# Patient Record
Sex: MALE | Race: WHITE | NOT HISPANIC OR LATINO | Employment: OTHER | URBAN - METROPOLITAN AREA
[De-identification: names, ages, dates, MRNs, and addresses within clinical notes are randomized per-mention and may not be internally consistent; named-entity substitution may affect disease eponyms.]

---

## 2021-01-15 ENCOUNTER — TRANSCRIBE ORDERS (OUTPATIENT)
Dept: SLEEP MEDICINE | Facility: HOSPITAL | Age: 56
End: 2021-01-15

## 2021-01-15 DIAGNOSIS — G47.33 OBSTRUCTIVE SLEEP APNEA, ADULT: Primary | ICD-10-CM

## 2021-01-20 ENCOUNTER — TRANSCRIBE ORDERS (OUTPATIENT)
Dept: SLEEP MEDICINE | Facility: HOSPITAL | Age: 56
End: 2021-01-20

## 2021-01-20 ENCOUNTER — TRANSCRIBE ORDERS (OUTPATIENT)
Dept: OBSTETRICS AND GYNECOLOGY | Facility: CLINIC | Age: 56
End: 2021-01-20

## 2021-01-20 DIAGNOSIS — Z01.818 OTHER SPECIFIED PRE-OPERATIVE EXAMINATION: Primary | ICD-10-CM

## 2021-01-25 ENCOUNTER — LAB (OUTPATIENT)
Dept: LAB | Facility: HOSPITAL | Age: 56
End: 2021-01-25

## 2021-01-25 DIAGNOSIS — Z01.818 OTHER SPECIFIED PRE-OPERATIVE EXAMINATION: ICD-10-CM

## 2021-01-25 LAB — SARS-COV-2 ORF1AB RESP QL NAA+PROBE: NOT DETECTED

## 2021-01-25 PROCEDURE — C9803 HOPD COVID-19 SPEC COLLECT: HCPCS

## 2021-01-25 PROCEDURE — U0004 COV-19 TEST NON-CDC HGH THRU: HCPCS | Performed by: OBSTETRICS & GYNECOLOGY

## 2021-01-27 ENCOUNTER — APPOINTMENT (OUTPATIENT)
Dept: SLEEP MEDICINE | Facility: HOSPITAL | Age: 56
End: 2021-01-27

## 2021-01-29 ENCOUNTER — TRANSCRIBE ORDERS (OUTPATIENT)
Dept: ADMINISTRATIVE | Facility: HOSPITAL | Age: 56
End: 2021-01-29

## 2021-01-29 DIAGNOSIS — Z01.818 OTHER SPECIFIED PRE-OPERATIVE EXAMINATION: Primary | ICD-10-CM

## 2021-01-30 ENCOUNTER — LAB (OUTPATIENT)
Dept: LAB | Facility: HOSPITAL | Age: 56
End: 2021-01-30

## 2021-01-30 ENCOUNTER — APPOINTMENT (OUTPATIENT)
Dept: LAB | Facility: HOSPITAL | Age: 56
End: 2021-01-30

## 2021-01-30 DIAGNOSIS — Z01.818 OTHER SPECIFIED PRE-OPERATIVE EXAMINATION: ICD-10-CM

## 2021-02-02 ENCOUNTER — HOSPITAL ENCOUNTER (OUTPATIENT)
Dept: SLEEP MEDICINE | Facility: HOSPITAL | Age: 56
Discharge: HOME | End: 2021-02-02
Admitting: INTERNAL MEDICINE

## 2021-02-02 DIAGNOSIS — G47.33 OBSTRUCTIVE SLEEP APNEA, ADULT: ICD-10-CM

## 2021-02-02 DIAGNOSIS — G47.33 OSA (OBSTRUCTIVE SLEEP APNEA): ICD-10-CM

## 2021-02-02 PROCEDURE — 95810 POLYSOM 6/> YRS 4/> PARAM: CPT

## 2021-02-02 PROCEDURE — 95810 POLYSOM 6/> YRS 4/> PARAM: CPT | Performed by: INTERNAL MEDICINE

## 2021-02-09 DIAGNOSIS — G47.33 OBSTRUCTIVE SLEEP APNEA, ADULT: Primary | ICD-10-CM

## 2021-02-09 RX ORDER — ZOLPIDEM TARTRATE 5 MG/1
5 TABLET ORAL TAKE AS DIRECTED
Qty: 2 TABLET | Refills: 0 | Status: ON HOLD | OUTPATIENT
Start: 2021-02-09 | End: 2021-04-13

## 2021-02-12 ENCOUNTER — TRANSCRIBE ORDERS (OUTPATIENT)
Dept: SLEEP MEDICINE | Facility: HOSPITAL | Age: 56
End: 2021-02-12

## 2021-02-12 DIAGNOSIS — G47.33 OSA (OBSTRUCTIVE SLEEP APNEA): Primary | ICD-10-CM

## 2021-02-15 ENCOUNTER — DOCUMENTATION (OUTPATIENT)
Dept: SLEEP MEDICINE | Facility: HOSPITAL | Age: 56
End: 2021-02-15

## 2021-02-16 ENCOUNTER — TRANSCRIBE ORDERS (OUTPATIENT)
Dept: OBSTETRICS AND GYNECOLOGY | Facility: CLINIC | Age: 56
End: 2021-02-16

## 2021-02-16 DIAGNOSIS — Z01.818 OTHER SPECIFIED PRE-OPERATIVE EXAMINATION: Primary | ICD-10-CM

## 2021-04-01 ENCOUNTER — APPOINTMENT (OUTPATIENT)
Dept: SLEEP MEDICINE | Facility: HOSPITAL | Age: 56
End: 2021-04-01

## 2021-04-12 ENCOUNTER — APPOINTMENT (OUTPATIENT)
Dept: SLEEP MEDICINE | Facility: HOSPITAL | Age: 56
End: 2021-04-12

## 2021-04-13 ENCOUNTER — HOSPITAL ENCOUNTER (OUTPATIENT)
Facility: HOSPITAL | Age: 56
Setting detail: OBSERVATION
Discharge: HOME | End: 2021-04-15
Attending: INTERNAL MEDICINE | Admitting: HOSPITALIST

## 2021-04-13 ENCOUNTER — APPOINTMENT (OUTPATIENT)
Dept: CT IMAGING | Facility: HOSPITAL | Age: 56
End: 2021-04-13

## 2021-04-13 ENCOUNTER — HOSPITAL ENCOUNTER (EMERGENCY)
Facility: HOSPITAL | Age: 56
Discharge: ANOTHER HOSPITAL | End: 2021-04-13
Attending: EMERGENCY MEDICINE | Admitting: EMERGENCY MEDICINE

## 2021-04-13 ENCOUNTER — APPOINTMENT (OUTPATIENT)
Dept: MRI IMAGING | Facility: HOSPITAL | Age: 56
End: 2021-04-13

## 2021-04-13 ENCOUNTER — APPOINTMENT (OUTPATIENT)
Dept: GENERAL RADIOLOGY | Facility: HOSPITAL | Age: 56
End: 2021-04-13

## 2021-04-13 VITALS
DIASTOLIC BLOOD PRESSURE: 75 MMHG | HEIGHT: 72 IN | SYSTOLIC BLOOD PRESSURE: 118 MMHG | WEIGHT: 235 LBS | RESPIRATION RATE: 18 BRPM | HEART RATE: 70 BPM | TEMPERATURE: 97.7 F | BODY MASS INDEX: 31.83 KG/M2 | OXYGEN SATURATION: 94 %

## 2021-04-13 DIAGNOSIS — H40.053 ELEVATED IOP, BILATERAL: ICD-10-CM

## 2021-04-13 DIAGNOSIS — G44.209 ACUTE NON INTRACTABLE TENSION-TYPE HEADACHE: ICD-10-CM

## 2021-04-13 DIAGNOSIS — H53.2 DIPLOPIA: Primary | ICD-10-CM

## 2021-04-13 LAB
ALBUMIN SERPL-MCNC: 4.5 G/DL (ref 3.5–5.2)
ALBUMIN/GLOB SERPL: 1.4 G/DL
ALP SERPL-CCNC: 87 U/L (ref 39–117)
ALT SERPL W P-5'-P-CCNC: 45 U/L (ref 1–41)
ANION GAP SERPL CALCULATED.3IONS-SCNC: 12.8 MMOL/L (ref 5–15)
AST SERPL-CCNC: 27 U/L (ref 1–40)
BASOPHILS # BLD AUTO: 0.05 10*3/MM3 (ref 0–0.2)
BASOPHILS NFR BLD AUTO: 0.6 % (ref 0–1.5)
BILIRUB SERPL-MCNC: 0.8 MG/DL (ref 0–1.2)
BILIRUB UR QL STRIP: NEGATIVE
BUN SERPL-MCNC: 15 MG/DL (ref 6–20)
BUN/CREAT SERPL: 16.9 (ref 7–25)
CALCIUM SPEC-SCNC: 9.3 MG/DL (ref 8.6–10.5)
CHLORIDE SERPL-SCNC: 105 MMOL/L (ref 98–107)
CLARITY UR: CLEAR
CO2 SERPL-SCNC: 22.2 MMOL/L (ref 22–29)
COLOR UR: YELLOW
CREAT SERPL-MCNC: 0.89 MG/DL (ref 0.76–1.27)
DEPRECATED RDW RBC AUTO: 41.1 FL (ref 37–54)
EOSINOPHIL # BLD AUTO: 0.24 10*3/MM3 (ref 0–0.4)
EOSINOPHIL NFR BLD AUTO: 2.9 % (ref 0.3–6.2)
ERYTHROCYTE [DISTWIDTH] IN BLOOD BY AUTOMATED COUNT: 11.8 % (ref 12.3–15.4)
GFR SERPL CREATININE-BSD FRML MDRD: 89 ML/MIN/1.73
GLOBULIN UR ELPH-MCNC: 3.2 GM/DL
GLUCOSE BLDC GLUCOMTR-MCNC: 135 MG/DL (ref 70–130)
GLUCOSE BLDC GLUCOMTR-MCNC: 138 MG/DL (ref 70–130)
GLUCOSE SERPL-MCNC: 188 MG/DL (ref 65–99)
GLUCOSE UR STRIP-MCNC: NEGATIVE MG/DL
HCT VFR BLD AUTO: 47.2 % (ref 37.5–51)
HGB BLD-MCNC: 16 G/DL (ref 13–17.7)
HGB UR QL STRIP.AUTO: NEGATIVE
IMM GRANULOCYTES # BLD AUTO: 0.04 10*3/MM3 (ref 0–0.05)
IMM GRANULOCYTES NFR BLD AUTO: 0.5 % (ref 0–0.5)
KETONES UR QL STRIP: NEGATIVE
LEUKOCYTE ESTERASE UR QL STRIP.AUTO: NEGATIVE
LYMPHOCYTES # BLD AUTO: 2.39 10*3/MM3 (ref 0.7–3.1)
LYMPHOCYTES NFR BLD AUTO: 29.1 % (ref 19.6–45.3)
MCH RBC QN AUTO: 32.5 PG (ref 26.6–33)
MCHC RBC AUTO-ENTMCNC: 33.9 G/DL (ref 31.5–35.7)
MCV RBC AUTO: 95.7 FL (ref 79–97)
MONOCYTES # BLD AUTO: 0.8 10*3/MM3 (ref 0.1–0.9)
MONOCYTES NFR BLD AUTO: 9.8 % (ref 5–12)
NEUTROPHILS NFR BLD AUTO: 4.68 10*3/MM3 (ref 1.7–7)
NEUTROPHILS NFR BLD AUTO: 57.1 % (ref 42.7–76)
NITRITE UR QL STRIP: NEGATIVE
NRBC BLD AUTO-RTO: 0 /100 WBC (ref 0–0.2)
PH UR STRIP.AUTO: 6 [PH] (ref 4.5–8)
PLATELET # BLD AUTO: 142 10*3/MM3 (ref 140–450)
PMV BLD AUTO: 10.9 FL (ref 6–12)
POTASSIUM SERPL-SCNC: 4.3 MMOL/L (ref 3.5–5.2)
PROT SERPL-MCNC: 7.7 G/DL (ref 6–8.5)
PROT UR QL STRIP: NEGATIVE
RBC # BLD AUTO: 4.93 10*6/MM3 (ref 4.14–5.8)
SARS-COV-2 RNA PNL SPEC NAA+PROBE: NOT DETECTED
SODIUM SERPL-SCNC: 140 MMOL/L (ref 136–145)
SP GR UR STRIP: 1.02 (ref 1–1.03)
T4 FREE SERPL-MCNC: 1.17 NG/DL (ref 0.93–1.7)
TROPONIN T SERPL-MCNC: <0.01 NG/ML (ref 0–0.03)
TSH SERPL DL<=0.05 MIU/L-ACNC: 0.64 UIU/ML (ref 0.27–4.2)
UROBILINOGEN UR QL STRIP: NORMAL
WBC # BLD AUTO: 8.2 10*3/MM3 (ref 3.4–10.8)

## 2021-04-13 PROCEDURE — A9577 INJ MULTIHANCE: HCPCS | Performed by: EMERGENCY MEDICINE

## 2021-04-13 PROCEDURE — 70150 X-RAY EXAM OF FACIAL BONES: CPT

## 2021-04-13 PROCEDURE — 96374 THER/PROPH/DIAG INJ IV PUSH: CPT

## 2021-04-13 PROCEDURE — 70553 MRI BRAIN STEM W/O & W/DYE: CPT

## 2021-04-13 PROCEDURE — 80053 COMPREHEN METABOLIC PANEL: CPT | Performed by: PHYSICIAN ASSISTANT

## 2021-04-13 PROCEDURE — 84443 ASSAY THYROID STIM HORMONE: CPT | Performed by: PHYSICIAN ASSISTANT

## 2021-04-13 PROCEDURE — 81003 URINALYSIS AUTO W/O SCOPE: CPT | Performed by: PHYSICIAN ASSISTANT

## 2021-04-13 PROCEDURE — 25010000002 PROCHLORPERAZINE 10 MG/2ML SOLUTION: Performed by: PHYSICIAN ASSISTANT

## 2021-04-13 PROCEDURE — 82962 GLUCOSE BLOOD TEST: CPT

## 2021-04-13 PROCEDURE — 70496 CT ANGIOGRAPHY HEAD: CPT

## 2021-04-13 PROCEDURE — 85025 COMPLETE CBC W/AUTO DIFF WBC: CPT | Performed by: PHYSICIAN ASSISTANT

## 2021-04-13 PROCEDURE — 84484 ASSAY OF TROPONIN QUANT: CPT | Performed by: PHYSICIAN ASSISTANT

## 2021-04-13 PROCEDURE — 99284 EMERGENCY DEPT VISIT MOD MDM: CPT | Performed by: PHYSICIAN ASSISTANT

## 2021-04-13 PROCEDURE — 70498 CT ANGIOGRAPHY NECK: CPT

## 2021-04-13 PROCEDURE — 99284 EMERGENCY DEPT VISIT MOD MDM: CPT

## 2021-04-13 PROCEDURE — 0 GADOBENATE DIMEGLUMINE 529 MG/ML SOLUTION: Performed by: EMERGENCY MEDICINE

## 2021-04-13 PROCEDURE — G0378 HOSPITAL OBSERVATION PER HR: HCPCS

## 2021-04-13 PROCEDURE — 25010000002 DIPHENHYDRAMINE PER 50 MG: Performed by: PHYSICIAN ASSISTANT

## 2021-04-13 PROCEDURE — 96375 TX/PRO/DX INJ NEW DRUG ADDON: CPT

## 2021-04-13 PROCEDURE — 87635 SARS-COV-2 COVID-19 AMP PRB: CPT | Performed by: PHYSICIAN ASSISTANT

## 2021-04-13 PROCEDURE — 93005 ELECTROCARDIOGRAM TRACING: CPT | Performed by: PHYSICIAN ASSISTANT

## 2021-04-13 PROCEDURE — 36415 COLL VENOUS BLD VENIPUNCTURE: CPT

## 2021-04-13 PROCEDURE — 84439 ASSAY OF FREE THYROXINE: CPT | Performed by: PHYSICIAN ASSISTANT

## 2021-04-13 PROCEDURE — 70450 CT HEAD/BRAIN W/O DYE: CPT

## 2021-04-13 PROCEDURE — C9803 HOPD COVID-19 SPEC COLLECT: HCPCS | Performed by: PHYSICIAN ASSISTANT

## 2021-04-13 PROCEDURE — 0 IOPAMIDOL PER 1 ML: Performed by: EMERGENCY MEDICINE

## 2021-04-13 PROCEDURE — 93010 ELECTROCARDIOGRAM REPORT: CPT | Performed by: INTERNAL MEDICINE

## 2021-04-13 RX ORDER — PROCHLORPERAZINE EDISYLATE 5 MG/ML
5 INJECTION INTRAMUSCULAR; INTRAVENOUS ONCE
Status: COMPLETED | OUTPATIENT
Start: 2021-04-13 | End: 2021-04-13

## 2021-04-13 RX ORDER — DIPHENHYDRAMINE HYDROCHLORIDE 50 MG/ML
25 INJECTION INTRAMUSCULAR; INTRAVENOUS ONCE
Status: COMPLETED | OUTPATIENT
Start: 2021-04-13 | End: 2021-04-13

## 2021-04-13 RX ORDER — ALBUTEROL SULFATE 1.25 MG/3ML
1 SOLUTION RESPIRATORY (INHALATION) EVERY 6 HOURS PRN
COMMUNITY

## 2021-04-13 RX ORDER — PROPARACAINE HYDROCHLORIDE 5 MG/ML
2 SOLUTION/ DROPS OPHTHALMIC ONCE
Status: COMPLETED | OUTPATIENT
Start: 2021-04-13 | End: 2021-04-13

## 2021-04-13 RX ORDER — SODIUM CHLORIDE 0.9 % (FLUSH) 0.9 %
10 SYRINGE (ML) INJECTION AS NEEDED
Status: DISCONTINUED | OUTPATIENT
Start: 2021-04-13 | End: 2021-04-13 | Stop reason: HOSPADM

## 2021-04-13 RX ADMIN — GADOBENATE DIMEGLUMINE 20 ML: 529 INJECTION, SOLUTION INTRAVENOUS at 17:18

## 2021-04-13 RX ADMIN — PROCHLORPERAZINE EDISYLATE 5 MG: 5 INJECTION INTRAMUSCULAR; INTRAVENOUS at 19:39

## 2021-04-13 RX ADMIN — IOPAMIDOL 100 ML: 755 INJECTION, SOLUTION INTRAVENOUS at 14:07

## 2021-04-13 RX ADMIN — DIPHENHYDRAMINE HYDROCHLORIDE 25 MG: 50 INJECTION, SOLUTION INTRAMUSCULAR; INTRAVENOUS at 19:36

## 2021-04-13 RX ADMIN — PROPARACAINE HYDROCHLORIDE 2 DROP: 5 SOLUTION/ DROPS OPHTHALMIC at 15:50

## 2021-04-13 RX ADMIN — FLUORESCEIN SODIUM 1 STRIP: 1 STRIP OPHTHALMIC at 15:49

## 2021-04-13 NOTE — ED PROVIDER NOTES
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 97.9 °F (36.6 °C) (Oral) O2 sat- 97%    DISPOSITION  Patient transferred to [facility] for further neurology and ophthalmology work-up.       Medication List      No changes were made to your prescriptions during this visit.                   Dictated utilizing Dragon dictation     Eufemia Rodriguez PA-C  04/13/21 2115       Eufemia Rodriguez PA-C  04/13/21 2116

## 2021-04-13 NOTE — ED NOTES
Call [facility] hospitalist. They will be paged and return the call     Antonina Lewis  04/13/21 6245

## 2021-04-14 ENCOUNTER — APPOINTMENT (OUTPATIENT)
Dept: MRI IMAGING | Facility: HOSPITAL | Age: 56
End: 2021-04-14

## 2021-04-14 PROBLEM — H53.8 BLURRED VISION: Status: ACTIVE | Noted: 2021-04-14

## 2021-04-14 PROBLEM — H53.8 BLURRED VISION, BILATERAL: Status: ACTIVE | Noted: 2021-04-14

## 2021-04-14 PROBLEM — E11.65 TYPE 2 DIABETES MELLITUS WITH HYPERGLYCEMIA, WITHOUT LONG-TERM CURRENT USE OF INSULIN (HCC): Status: ACTIVE | Noted: 2021-04-14

## 2021-04-14 LAB
ANION GAP SERPL CALCULATED.3IONS-SCNC: 9.5 MMOL/L (ref 5–15)
BASOPHILS # BLD AUTO: 0.04 10*3/MM3 (ref 0–0.2)
BASOPHILS NFR BLD AUTO: 0.6 % (ref 0–1.5)
BUN SERPL-MCNC: 13 MG/DL (ref 6–20)
BUN/CREAT SERPL: 16 (ref 7–25)
CALCIUM SPEC-SCNC: 8.8 MG/DL (ref 8.6–10.5)
CHLORIDE SERPL-SCNC: 108 MMOL/L (ref 98–107)
CO2 SERPL-SCNC: 23.5 MMOL/L (ref 22–29)
CREAT SERPL-MCNC: 0.81 MG/DL (ref 0.76–1.27)
CRP SERPL-MCNC: 0.56 MG/DL (ref 0–0.5)
DEPRECATED RDW RBC AUTO: 40.5 FL (ref 37–54)
EOSINOPHIL # BLD AUTO: 0.29 10*3/MM3 (ref 0–0.4)
EOSINOPHIL NFR BLD AUTO: 4.2 % (ref 0.3–6.2)
ERYTHROCYTE [DISTWIDTH] IN BLOOD BY AUTOMATED COUNT: 11.7 % (ref 12.3–15.4)
ERYTHROCYTE [SEDIMENTATION RATE] IN BLOOD: 10 MM/HR (ref 0–20)
GFR SERPL CREATININE-BSD FRML MDRD: 99 ML/MIN/1.73
GLUCOSE BLDC GLUCOMTR-MCNC: 151 MG/DL (ref 70–130)
GLUCOSE BLDC GLUCOMTR-MCNC: 182 MG/DL (ref 70–130)
GLUCOSE BLDC GLUCOMTR-MCNC: 205 MG/DL (ref 70–130)
GLUCOSE BLDC GLUCOMTR-MCNC: 241 MG/DL (ref 70–130)
GLUCOSE SERPL-MCNC: 238 MG/DL (ref 65–99)
HBA1C MFR BLD: 7.9 % (ref 4.8–5.6)
HCT VFR BLD AUTO: 42.9 % (ref 37.5–51)
HGB BLD-MCNC: 14.8 G/DL (ref 13–17.7)
INR PPP: 1.07 (ref 0.9–1.1)
LYMPHOCYTES # BLD AUTO: 2.48 10*3/MM3 (ref 0.7–3.1)
LYMPHOCYTES NFR BLD AUTO: 35.7 % (ref 19.6–45.3)
MAGNESIUM SERPL-MCNC: 2.1 MG/DL (ref 1.6–2.6)
MCH RBC QN AUTO: 33.3 PG (ref 26.6–33)
MCHC RBC AUTO-ENTMCNC: 34.5 G/DL (ref 31.5–35.7)
MCV RBC AUTO: 96.4 FL (ref 79–97)
MONOCYTES # BLD AUTO: 0.82 10*3/MM3 (ref 0.1–0.9)
MONOCYTES NFR BLD AUTO: 11.8 % (ref 5–12)
NEUTROPHILS NFR BLD AUTO: 3.3 10*3/MM3 (ref 1.7–7)
NEUTROPHILS NFR BLD AUTO: 47.4 % (ref 42.7–76)
PLATELET # BLD AUTO: 138 10*3/MM3 (ref 140–450)
PMV BLD AUTO: 11.1 FL (ref 6–12)
POTASSIUM SERPL-SCNC: 4 MMOL/L (ref 3.5–5.2)
PROTHROMBIN TIME: 13.7 SECONDS (ref 11.7–14.2)
QT INTERVAL: 335 MS
RBC # BLD AUTO: 4.45 10*6/MM3 (ref 4.14–5.8)
SODIUM SERPL-SCNC: 141 MMOL/L (ref 136–145)
TSH SERPL DL<=0.05 MIU/L-ACNC: 1.72 UIU/ML (ref 0.27–4.2)
WBC # BLD AUTO: 6.95 10*3/MM3 (ref 3.4–10.8)

## 2021-04-14 PROCEDURE — G0378 HOSPITAL OBSERVATION PER HR: HCPCS

## 2021-04-14 PROCEDURE — 83735 ASSAY OF MAGNESIUM: CPT | Performed by: NURSE PRACTITIONER

## 2021-04-14 PROCEDURE — 85025 COMPLETE CBC W/AUTO DIFF WBC: CPT | Performed by: NURSE PRACTITIONER

## 2021-04-14 PROCEDURE — 80048 BASIC METABOLIC PNL TOTAL CA: CPT | Performed by: NURSE PRACTITIONER

## 2021-04-14 PROCEDURE — 0 GADOBENATE DIMEGLUMINE 529 MG/ML SOLUTION: Performed by: HOSPITALIST

## 2021-04-14 PROCEDURE — 96360 HYDRATION IV INFUSION INIT: CPT

## 2021-04-14 PROCEDURE — 85610 PROTHROMBIN TIME: CPT | Performed by: NURSE PRACTITIONER

## 2021-04-14 PROCEDURE — 80061 LIPID PANEL: CPT | Performed by: HOSPITALIST

## 2021-04-14 PROCEDURE — A9577 INJ MULTIHANCE: HCPCS | Performed by: HOSPITALIST

## 2021-04-14 PROCEDURE — 99204 OFFICE O/P NEW MOD 45 MIN: CPT | Performed by: PSYCHIATRY & NEUROLOGY

## 2021-04-14 PROCEDURE — 85652 RBC SED RATE AUTOMATED: CPT | Performed by: NURSE PRACTITIONER

## 2021-04-14 PROCEDURE — 86769 SARS-COV-2 COVID-19 ANTIBODY: CPT | Performed by: HOSPITALIST

## 2021-04-14 PROCEDURE — 83520 IMMUNOASSAY QUANT NOS NONAB: CPT | Performed by: PSYCHIATRY & NEUROLOGY

## 2021-04-14 PROCEDURE — 63710000001 INSULIN LISPRO (HUMAN) PER 5 UNITS: Performed by: NURSE PRACTITIONER

## 2021-04-14 PROCEDURE — 70543 MRI ORBT/FAC/NCK W/O &W/DYE: CPT

## 2021-04-14 PROCEDURE — 86140 C-REACTIVE PROTEIN: CPT | Performed by: NURSE PRACTITIONER

## 2021-04-14 PROCEDURE — 84443 ASSAY THYROID STIM HORMONE: CPT | Performed by: NURSE PRACTITIONER

## 2021-04-14 PROCEDURE — 83036 HEMOGLOBIN GLYCOSYLATED A1C: CPT | Performed by: NURSE PRACTITIONER

## 2021-04-14 PROCEDURE — 82962 GLUCOSE BLOOD TEST: CPT

## 2021-04-14 PROCEDURE — 96361 HYDRATE IV INFUSION ADD-ON: CPT

## 2021-04-14 RX ORDER — ACETAMINOPHEN 160 MG/5ML
650 SOLUTION ORAL EVERY 4 HOURS PRN
Status: DISCONTINUED | OUTPATIENT
Start: 2021-04-14 | End: 2021-04-15 | Stop reason: HOSPADM

## 2021-04-14 RX ORDER — ALBUTEROL SULFATE 1.25 MG/3ML
1 SOLUTION RESPIRATORY (INHALATION) EVERY 6 HOURS PRN
Status: DISCONTINUED | OUTPATIENT
Start: 2021-04-14 | End: 2021-04-15 | Stop reason: HOSPADM

## 2021-04-14 RX ORDER — NICOTINE POLACRILEX 4 MG
15 LOZENGE BUCCAL
Status: DISCONTINUED | OUTPATIENT
Start: 2021-04-14 | End: 2021-04-15 | Stop reason: HOSPADM

## 2021-04-14 RX ORDER — SODIUM CHLORIDE 0.9 % (FLUSH) 0.9 %
10 SYRINGE (ML) INJECTION AS NEEDED
Status: DISCONTINUED | OUTPATIENT
Start: 2021-04-14 | End: 2021-04-15 | Stop reason: HOSPADM

## 2021-04-14 RX ORDER — ACETAMINOPHEN 650 MG/1
650 SUPPOSITORY RECTAL EVERY 4 HOURS PRN
Status: DISCONTINUED | OUTPATIENT
Start: 2021-04-14 | End: 2021-04-15 | Stop reason: HOSPADM

## 2021-04-14 RX ORDER — FOLIC ACID 1 MG/1
1 TABLET ORAL DAILY
Status: DISCONTINUED | OUTPATIENT
Start: 2021-04-15 | End: 2021-04-15 | Stop reason: HOSPADM

## 2021-04-14 RX ORDER — ACETAMINOPHEN 325 MG/1
650 TABLET ORAL EVERY 4 HOURS PRN
Status: DISCONTINUED | OUTPATIENT
Start: 2021-04-14 | End: 2021-04-15 | Stop reason: HOSPADM

## 2021-04-14 RX ORDER — SODIUM CHLORIDE 9 MG/ML
100 INJECTION, SOLUTION INTRAVENOUS CONTINUOUS
Status: DISCONTINUED | OUTPATIENT
Start: 2021-04-14 | End: 2021-04-14

## 2021-04-14 RX ORDER — DEXTROSE MONOHYDRATE 25 G/50ML
25 INJECTION, SOLUTION INTRAVENOUS
Status: DISCONTINUED | OUTPATIENT
Start: 2021-04-14 | End: 2021-04-15 | Stop reason: HOSPADM

## 2021-04-14 RX ORDER — INSULIN LISPRO 100 [IU]/ML
0-7 INJECTION, SOLUTION INTRAVENOUS; SUBCUTANEOUS
Status: DISCONTINUED | OUTPATIENT
Start: 2021-04-14 | End: 2021-04-15 | Stop reason: HOSPADM

## 2021-04-14 RX ORDER — SODIUM CHLORIDE 0.9 % (FLUSH) 0.9 %
10 SYRINGE (ML) INJECTION EVERY 12 HOURS SCHEDULED
Status: DISCONTINUED | OUTPATIENT
Start: 2021-04-14 | End: 2021-04-15 | Stop reason: HOSPADM

## 2021-04-14 RX ORDER — DIPHENOXYLATE HYDROCHLORIDE AND ATROPINE SULFATE 2.5; .025 MG/1; MG/1
1 TABLET ORAL DAILY
Status: DISCONTINUED | OUTPATIENT
Start: 2021-04-15 | End: 2021-04-15 | Stop reason: HOSPADM

## 2021-04-14 RX ORDER — ONDANSETRON 2 MG/ML
4 INJECTION INTRAMUSCULAR; INTRAVENOUS EVERY 6 HOURS PRN
Status: DISCONTINUED | OUTPATIENT
Start: 2021-04-14 | End: 2021-04-15 | Stop reason: HOSPADM

## 2021-04-14 RX ORDER — NITROGLYCERIN 0.4 MG/1
0.4 TABLET SUBLINGUAL
Status: DISCONTINUED | OUTPATIENT
Start: 2021-04-14 | End: 2021-04-15 | Stop reason: HOSPADM

## 2021-04-14 RX ADMIN — ACETAMINOPHEN 650 MG: 325 TABLET, FILM COATED ORAL at 17:41

## 2021-04-14 RX ADMIN — INSULIN LISPRO 3 UNITS: 100 INJECTION, SOLUTION INTRAVENOUS; SUBCUTANEOUS at 17:41

## 2021-04-14 RX ADMIN — GADOBENATE DIMEGLUMINE 20 ML: 529 INJECTION, SOLUTION INTRAVENOUS at 16:55

## 2021-04-14 RX ADMIN — SODIUM CHLORIDE 100 ML/HR: 9 INJECTION, SOLUTION INTRAVENOUS at 00:22

## 2021-04-14 RX ADMIN — SODIUM CHLORIDE, PRESERVATIVE FREE 10 ML: 5 INJECTION INTRAVENOUS at 01:20

## 2021-04-14 RX ADMIN — SODIUM CHLORIDE, PRESERVATIVE FREE 10 ML: 5 INJECTION INTRAVENOUS at 21:36

## 2021-04-14 NOTE — PROGRESS NOTES
[facility]     LOS: 0 days     Subjective:    CC:No chief complaint on file.    DIET:  Diet Order   Procedures   • Diet Regular; Consistent Carbohydrate   vision is still abnormal  Still with double vision    no cp  No soa  No n/v/d    Objective:    Vital Signs:  Temp:  [97.6 °F (36.4 °C)-98 °F (36.7 °C)] 98 °F (36.7 °C)  Heart Rate:  [70-91] 90  Resp:  [14-20] 18  BP: (103-137)/(70-97) 130/84    SpO2:  [94 %-98 %] 95 %  on   ;   Device (Oxygen Therapy): room air  There is no height or weight on file to calculate BMI.    Physical Exam  Constitutional:       Appearance: He is well-developed.   HENT:      Head: Normocephalic and atraumatic.   Cardiovascular:      Rate and Rhythm: Normal rate and regular rhythm.      Heart sounds: No murmur heard.   No friction rub.   Pulmonary:      Effort: Pulmonary effort is normal.      Breath sounds: Normal breath sounds.   Abdominal:      General: Bowel sounds are normal. There is no distension.      Palpations: Abdomen is soft.      Tenderness: There is no abdominal tenderness.   Skin:     General: Skin is warm and dry.   Neurological:      Mental Status: He is alert.   Psychiatric:         Mood and Affect: Mood normal.         Behavior: Behavior normal.         Results Review:    Glucose   Date Value Ref Range Status   04/14/2021 238 (H) 65 - 99 mg/dL Final   04/13/2021 188 (H) 65 - 99 mg/dL Final     Results from last 7 days   Lab Units 04/14/21  0459   WBC 10*3/mm3 6.95   HEMOGLOBIN g/dL 14.8   HEMATOCRIT % 42.9   PLATELETS 10*3/mm3 138*     Results from last 7 days   Lab Units 04/14/21  0459 04/13/21  1132   SODIUM mmol/L 141 140   POTASSIUM mmol/L 4.0 4.3   CHLORIDE mmol/L 108* 105   CO2 mmol/L 23.5 22.2   BUN mg/dL 13 15   CREATININE mg/dL 0.81 0.89   CALCIUM mg/dL 8.8 9.3   BILIRUBIN mg/dL  --  0.8   ALK PHOS U/L  --  87   ALT (SGPT) U/L  --  45*   AST (SGOT) U/L  --  27   GLUCOSE mg/dL 238* 188*     Results from last 7 days   Lab Units  04/14/21  0459   INR  1.07     Results from last 7 days   Lab Units 04/14/21  0459   MAGNESIUM mg/dL 2.1     Results from last 7 days   Lab Units 04/13/21  1132   TROPONIN T ng/mL <0.010     Cultures:  No results found for: BLOODCX, URINECX, WOUNDCX, MRSACX, RESPCX, STOOLCX    I have reviewed daily medications and changes in CPOE    Scheduled meds  [START ON 4/15/2021] thiamine, 100 mg, Oral, Daily   And  [START ON 4/15/2021] multivitamin, 1 tablet, Oral, Daily   And  [START ON 4/15/2021] folic acid, 1 mg, Oral, Daily  insulin lispro, 0-7 Units, Subcutaneous, TID AC  sodium chloride, 10 mL, Intravenous, Q12H           PRN meds  •  acetaminophen **OR** acetaminophen **OR** acetaminophen  •  albuterol  •  dextrose  •  dextrose  •  glucagon (human recombinant)  •  nitroglycerin  •  ondansetron  •  sodium chloride        Blurred vision, bilateral    Type 2 diabetes mellitus with hyperglycemia, without long-term current use of insulin (CMS/[facility])    Blurred vision      Assessment/Plan:     Blurred vision bilaterally  -Consult neurology and d/w them  -Consult ophthalmology and he is to follow up with them tomorrow assuming the orbital mri is unremarkable  -CTA and MRI head done at [facility]  -headaches over the last week, never had his before, mild eye pain, visual acuity remains intact   -likely d/c on aspirin and lipitor    Type 2 diabetes  -Accu-Cheks before meals and at bedtime with correctional dose insulin  -HbA1c 7.9  -Hold Januvia and Trulicity while hospitalized    DVT PPX: scd      Yuriy Krueger MD  04/14/21  14:51 EDT

## 2021-04-14 NOTE — H&P
Patient Name:  Brandon Tavera  YOB: 1965  MRN:  [id number]  Admit Date:  4/13/2021  Patient Care Team:  Provider, No Known as PCP - General      Subjective   History Present Illness     Chief complaint: Blurred vision and headache    History of Present Illness   Mr. Tavera is a 56-year-old male with history of type 2 diabetes and asthma who presented to the emergency room at [facility] with complaints of diplopia for the past 5 days, it is not getting worse, but is not improved.  He has had intermittent headaches located behind his eye and across his forehead.  He states nothing seems to make it worse or better.  He has not had recent eye exam in the past 2 years, he does wear glasses.  Patient states when he closes one eye he does not have blurred vision or double vision, but with both eyes open he has blurred/double vision.  He denies any trouble with speech, no other neuro deficits, no weakness or numbness and tingling in any extremities.  At [facility] patient had CTA of his head and neck per recommendation of neurology, CT did show a very tiny aneurysm that neurology thought would not be causing his double vision, he also had MRI of his head that showed no acute infarct or intracranial hemorrhage.  The emergency room provider at [facility] also performed eye exam that showed intraocular pressure on his right eye was 24 mmHg and his left eye was 20 mmHg.  The pressures were discussed with Dr. Myrick with neurology who recommend transfer patient here to [facility] for ophthalmology to evaluate.  Vital signs and lab work about [address] [place] were all stable.  Patient does state he drinks a cocktail nightly before bed.    Review of Systems   Constitutional: Negative for appetite change and fever.   HENT: Negative for nosebleeds and trouble swallowing.    Eyes: Positive for pain (pressure behind his eyes), redness and visual disturbance  (blurred and double vision when both eyes are open). Negative for photophobia.   Respiratory: Negative for cough, chest tightness, shortness of breath and wheezing.    Cardiovascular: Negative for chest pain, palpitations and leg swelling.   Gastrointestinal: Negative for abdominal distention, abdominal pain, nausea and vomiting.   Endocrine: Negative.    Genitourinary: Negative.    Musculoskeletal: Negative for gait problem and joint swelling.   Skin: Negative.    Neurological: Positive for headaches (forhead area and behind eyes). Negative for dizziness, seizures, speech difficulty and light-headedness.   Hematological: Negative.    Psychiatric/Behavioral: Negative for behavioral problems and confusion.        Personal History     Past Medical History:   Diagnosis Date   • Diabetes mellitus (CMS/HCC)    • Skin cancer      Past Surgical History:   Procedure Laterality Date   • MOHS SURGERY       No family history on file.  Social History     Tobacco Use   • Smoking status: Former Smoker   Substance Use Topics   • Alcohol use: Yes   • Drug use: Not on file     Current Facility-Administered Medications on File Prior to Encounter   Medication Dose Route Frequency Provider Last Rate Last Admin   • [COMPLETED] diphenhydrAMINE (BENADRYL) injection 25 mg  25 mg Intravenous Once Eufemia Rodriguez PA-C   25 mg at 04/13/21 1936   • [COMPLETED] fluorescein ophthalmic strip 1 strip  1 strip Both Eyes Once Eufemia Rodriguez PA-C   1 strip at 04/13/21 1549   • [COMPLETED] gadobenate dimeglumine (MULTIHANCE) injection 20 mL  20 mL Intravenous Once in imaging Jeffrey Oconnor MD   20 mL at 04/13/21 1718   • [COMPLETED] iopamidol (ISOVUE-370) 76 % injection 100 mL  100 mL Intravenous Once in imaging Jeffrey Oconnor MD   100 mL at 04/13/21 1407   • [COMPLETED] prochlorperazine (COMPAZINE) injection 5 mg  5 mg Intravenous Once Eufemia Rodriguez PA-C   5 mg at 04/13/21 1939   • [COMPLETED] proparacaine (ALCAINE) 0.5 %  ophthalmic solution 2 drop  2 drop Both Eyes Once Eufemia Rodriguez PA-C   2 drop at 04/13/21 1550   • [DISCONTINUED] sodium chloride 0.9 % flush 10 mL  10 mL Intravenous PRN Eufemia Rodriguez PA-C         Current Outpatient Medications on File Prior to Encounter   Medication Sig Dispense Refill   • albuterol (ACCUNEB) 1.25 MG/3ML nebulizer solution Take 1 ampule by nebulization Every 6 (Six) Hours As Needed for Wheezing.     • Dulaglutide (TRULICITY SC) Inject 1.3 Int'l Units/mL under the skin into the appropriate area as directed 1 (One) Time Per Week. Due every wednesdays     • ipratropium-albuterol (COMBIVENT RESPIMAT)  MCG/ACT inhaler Inhale 1 puff 4 (Four) Times a Day As Needed for Wheezing.     • SITagliptin (JANUVIA) 100 MG tablet Take 100 mg by mouth Every Night.       Allergies   Allergen Reactions   • Sulfa Antibiotics Anaphylaxis       Objective    Objective     Vital Signs  Temp:  [97.6 °F (36.4 °C)-97.9 °F (36.6 °C)] 97.6 °F (36.4 °C)  Heart Rate:  [70-89] 73  Resp:  [14-20] 18  BP: (108-137)/(70-97) 137/91  SpO2:  [94 %-99 %] 97 %  on   ;   Device (Oxygen Therapy): room air  There is no height or weight on file to calculate BMI.    Physical Exam  Vitals and nursing note reviewed.   Constitutional:       General: He is not in acute distress.     Appearance: He is well-developed.   HENT:      Head: Normocephalic.   Eyes:      Comments: Double vision in both eyes independently, with both eyes open he does not have double vision, however he states vision is blurry past about 4 feet.  He has no trouble with reading up close, he states even the double vision is more at a distance.  No visual field cuts   Neck:      Vascular: No JVD.   Cardiovascular:      Rate and Rhythm: Normal rate and regular rhythm.      Heart sounds: Normal heart sounds.      Comments: Normal sinus rhythm on the monitor with heart rate 74 during my exam  Pulmonary:      Effort: Pulmonary effort is normal.      Breath  sounds: Normal breath sounds.      Comments: Lung sounds clear, sats 97% on room air  Abdominal:      General: There is no distension.      Palpations: Abdomen is soft.      Tenderness: There is no abdominal tenderness.   Musculoskeletal:         General: Normal range of motion.      Cervical back: Normal range of motion.      Right lower leg: No edema.      Left lower leg: No edema.   Skin:     General: Skin is warm and dry.      Capillary Refill: Capillary refill takes less than 2 seconds.   Neurological:      Mental Status: He is alert and oriented to person, place, and time.   Psychiatric:         Behavior: Behavior normal.         Results Review:  I reviewed the patient's new clinical results.  I reviewed the patient's new imaging results and agree with the interpretation.  I reviewed the patient's other test results and agree with the interpretation  I personally viewed and interpreted the patient's EKG/Telemetry data  Discussed with ED provider.    Lab Results (last 24 hours)     Procedure Component Value Units Date/Time    Comprehensive Metabolic Panel [[id number]]  (Abnormal) Collected: 04/13/21 1132    Specimen: Blood Updated: 04/13/21 1222     Glucose 188 mg/dL      BUN 15 mg/dL      Creatinine 0.89 mg/dL      Sodium 140 mmol/L      Potassium 4.3 mmol/L      Comment: Slight hemolysis detected by analyzer. Results may be affected.        Chloride 105 mmol/L      CO2 22.2 mmol/L      Calcium 9.3 mg/dL      Total Protein 7.7 g/dL      Albumin 4.50 g/dL      ALT (SGPT) 45 U/L      AST (SGOT) 27 U/L      Comment: Slight hemolysis detected by analyzer. Results may be affected.        Alkaline Phosphatase 87 U/L      Total Bilirubin 0.8 mg/dL      eGFR Non African Amer 89 mL/min/1.73      Globulin 3.2 gm/dL      A/G Ratio 1.4 g/dL      BUN/Creatinine Ratio 16.9     Anion Gap 12.8 mmol/L     Narrative:      GFR Normal >60  Chronic Kidney Disease <60  Kidney Failure <15      Troponin [[id number]]  (Normal)  Collected: 04/13/21 1132    Specimen: Blood Updated: 04/13/21 1220     Troponin T <0.010 ng/mL     Narrative:      Troponin T Reference Range:  <= 0.03 ng/mL-   Negative for AMI  >0.03 ng/mL-     Abnormal for myocardial necrosis.  Clinicians would have to utilize clinical acumen, EKG, Troponin and serial changes to determine if it is an Acute Myocardial Infarction or myocardial injury due to an underlying chronic condition.       Results may be falsely decreased if patient taking Biotin.      CBC & Differential [[id number]]  (Abnormal) Collected: 04/13/21 1212    Specimen: Blood Updated: 04/13/21 1226    Narrative:      The following orders were created for panel order CBC & Differential.  Procedure                               Abnormality         Status                     ---------                               -----------         ------                     Scan Slide[[id number]]                                                                  CBC Auto Differential[[id number]]        Abnormal            Final result                 Please view results for these tests on the individual orders.    CBC Auto Differential [[id number]]  (Abnormal) Collected: 04/13/21 1212    Specimen: Blood Updated: 04/13/21 1225     WBC 8.20 10*3/mm3      RBC 4.93 10*6/mm3      Hemoglobin 16.0 g/dL      Hematocrit 47.2 %      MCV 95.7 fL      MCH 32.5 pg      MCHC 33.9 g/dL      RDW 11.8 %      RDW-SD 41.1 fl      MPV 10.9 fL      Platelets 142 10*3/mm3      Neutrophil % 57.1 %      Lymphocyte % 29.1 %      Monocyte % 9.8 %      Eosinophil % 2.9 %      Basophil % 0.6 %      Immature Grans % 0.5 %      Neutrophils, Absolute 4.68 10*3/mm3      Lymphocytes, Absolute 2.39 10*3/mm3      Monocytes, Absolute 0.80 10*3/mm3      Eosinophils, Absolute 0.24 10*3/mm3      Basophils, Absolute 0.05 10*3/mm3      Immature Grans, Absolute 0.04 10*3/mm3      nRBC 0.0 /100 WBC     Urinalysis With Microscopic If Indicated (No Culture) - Urine, Clean Catch  [[id number]]  (Normal) Collected: 04/13/21 1214    Specimen: Urine, Clean Catch Updated: 04/13/21 1222     Color, UA Yellow     Appearance, UA Clear     pH, UA 6.0     Specific Gravity, UA 1.025     Glucose, UA Negative     Ketones, UA Negative     Bilirubin, UA Negative     Blood, UA Negative     Protein, UA Negative     Leuk Esterase, UA Negative     Nitrite, UA Negative     Urobilinogen, UA 0.2 E.U./dL    Narrative:      Urine microscopic not indicated.    TSH [[id number]]  (Normal) Collected: 04/13/21 1434    Specimen: Blood Updated: 04/13/21 1457     TSH 0.643 uIU/mL     T4, Free [[id number]]  (Normal) Collected: 04/13/21 1434    Specimen: Blood Updated: 04/13/21 1457     Free T4 1.17 ng/dL     Narrative:      Results may be falsely increased if patient taking Biotin.      POC Glucose Once [[id number]]  (Abnormal) Collected: 04/13/21 1733    Specimen: Blood Updated: 04/13/21 1739     Glucose 138 mg/dL     COVID PRE-OP / PRE-PROCEDURE SCREENING ORDER (NO ISOLATION) - Swab, Nasal Cavity [[id number]]  (Normal) Collected: 04/13/21 1734    Specimen: Swab from Nasal Cavity Updated: 04/13/21 1815    Narrative:      The following orders were created for panel order COVID PRE-OP / PRE-PROCEDURE SCREENING ORDER (NO ISOLATION) - Swab, Nasal Cavity.  Procedure                               Abnormality         Status                     ---------                               -----------         ------                     COVID-19,[facility] IN-CARA...[[id number]]  Normal              Final result                 Please view results for these tests on the individual orders.    COVID-19,[facility] [employer] IN-HOUSE,Nasal Swab No Transport Media 3-4 HR TAT - Swab, Nasal Cavity [[id number]]  (Normal) Collected: 04/13/21 1734    Specimen: Swab from Nasal Cavity Updated: 04/13/21 1815     COVID19 Not Detected    Narrative:      Fact sheet for providers: https://www.fda.gov/media/865733/download     Fact sheet for patients:  https://www.fda.gov/media/246124/download    Test performed by PCR.    Consider negative results in combination with clinical observations, patient history, and epidemiological information.    POC Glucose Once [[id number]]  (Abnormal) Collected: 04/13/21 2344    Specimen: Blood Updated: 04/13/21 2345     Glucose 135 mg/dL           Imaging Results (Last 24 Hours)     ** No results found for the last 24 hours. **              No orders to display        Assessment/Plan     Active Hospital Problems    Diagnosis  POA   • **Blurred vision, bilateral [H53.8]  Yes   • Type 2 diabetes mellitus with hyperglycemia, without long-term current use of insulin (CMS/[facility]) [E11.65]  Yes     Mr. Tavera is a 56-year-old male with history of type 2 diabetes and asthma who presented to the emergency room at [facility] with complaints of diplopia for the past 5 days, it is not getting worse, but is not improved.    Blurred vision bilaterally  -Consult neurology, [facility] spoke with Dr. Myrick  -Consult ophthalmology  -Neurochecks every 4 hours  -Telemetry unit for monitoring  -Recheck CBC, BMP in a.m.  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 Myrick thought this was not enough to cause patient's symptoms.  MRI shows no evidence for recent infarct, no acute intracranial abnormality is appreciated.    Type 2 diabetes  -Accu-Cheks before meals and at bedtime with correctional dose insulin  -Check A1c in a.m.  -Hold Januvia and Trulicity while hospitalized      · I discussed the patient's findings and my recommendations with patient and ED provider.    VTE Prophylaxis - SCDs.  Code Status - Full code.       GLADYS Flaherty  [facility]  04/14/21  00:07 EDT

## 2021-04-14 NOTE — PROGRESS NOTES
Discharge Planning Assessment  [facility]     Patient Name: Brandon Tavera  MRN: [id number]  Today's Date: 4/14/2021    Admit Date: 4/13/2021    Discharge Needs Assessment     Row Name 04/14/21 1250       Living Environment    Lives With  alone    Current Living Arrangements  home/apartment/condo    Primary Care Provided by  self    Able to Return to Prior Arrangements  yes       Transition Planning    Patient/Family Anticipates Transition to  home with family    Transportation Anticipated  family or friend will provide       Discharge Needs Assessment    Equipment Currently Used at Home  glucometer;nebulizer;cpap    Concerns to be Addressed  denies needs/concerns at this time    Equipment Needed After Discharge  none        Discharge Plan     Row Name 04/14/21 1253       Plan    Patient/Family in Agreement with Plan  yes    Plan Comments  CCP spoke with pt @ bedside, confirmed face sheet and primary pharmacy as [facility] in [place].  Pt states he lives alone, his sister does stay with him at times.  Pt is independent with ADL;s.  [facility] CCP did give pt [facility] info to find a primary care.  Plans are home.  Pt anticipates no needs, instructed that CCP would follow,  Kaylie CORREA RN/CCP    Row Name 04/14/21 1651       Plan    Plan  Plans are home        Continued Care and Services - Admitted Since 4/13/2021    Coordination has not been started for this encounter.       Expected Discharge Date and Time     Expected Discharge Date Expected Discharge Time    Apr 15, 2021         Demographic Summary    No documentation.       Functional Status     Row Name 04/14/21 1250       Functional Status    Usual Activity Tolerance  good    Current Activity Tolerance  good       Functional Status, IADL    Medications  independent        Psychosocial    No documentation.       Abuse/Neglect    No documentation.       Legal     Row Name 04/14/21 1250       Financial/Legal    Who Manages Finances if Patient  Unable  no living will        Substance Abuse    No documentation.       Patient Forms    No documentation.           Kaylie Herrear RN

## 2021-04-14 NOTE — CONSULTS
Neurology Consult Note    Consult Date: 4/14/2021    Referring MD: Iveth Shaffer, *    Reason for Consult I have been asked to see the patient in neurological consultation to render advice and opinion regarding vision loss, diplopia    Brandon Tavera is a 56 y.o. male past medical history of moderately well-controlled diabetes, prior episode of overactive thyroid who presented to the hospital with new onset of double vision. Symptoms started in the past week. He initially was seen at [facility] and underwent CTA and MRI which were negative. He was then transferred here. His double vision has persisted. He describes binocular horizontal diplopia that is typically worse with central gaze. It improves with tilting his head to the left or right. There is some associated headache and orbital pain. He denies any unilateral weakness or numbness. He thinks he may have some mild diminished visual acuity in the right eye. No significant family history of autoimmunity    Past Medical/Surgical Hx:  Past Medical History:   Diagnosis Date   • Diabetes mellitus (CMS/HCC)    • Skin cancer      Past Surgical History:   Procedure Laterality Date   • MOHS SURGERY         Medications On Admission  Medications Prior to Admission   Medication Sig Dispense Refill Last Dose   • albuterol (ACCUNEB) 1.25 MG/3ML nebulizer solution Take 1 ampule by nebulization Every 6 (Six) Hours As Needed for Wheezing.      • Dulaglutide (TRULICITY SC) Inject 1.3 Int'l Units/mL under the skin into the appropriate area as directed 1 (One) Time Per Week. Due every wednesdays      • ipratropium-albuterol (COMBIVENT RESPIMAT)  MCG/ACT inhaler Inhale 1 puff 4 (Four) Times a Day As Needed for Wheezing.      • SITagliptin (JANUVIA) 100 MG tablet Take 100 mg by mouth Every Night.          Allergies:  Allergies   Allergen Reactions   • Sulfa Antibiotics Anaphylaxis       Social Hx:  Social History     Socioeconomic History   • Marital status:  Single     Spouse name: Not on file   • Number of children: Not on file   • Years of education: Not on file   • Highest education level: Not on file   Tobacco Use   • Smoking status: Former Smoker   Substance and Sexual Activity   • Alcohol use: Yes       Family Hx:  No family history on file.    Review of systems  Constitutional: [No fevers, chills]  Eye: [+ Recent visual problems, no eye discharge]  Respiratory: [No shortness of breath, cough]  Cardiovascular: [No Chest pain, palpitations]  Neurologic: [No weakness, numbness]  Psychiatric: [No anxiety, depression]    All other systems reviewed and are negative    Exam    /78 (BP Location: Left arm, Patient Position: Lying)   Pulse 91   Temp 98 °F (36.7 °C) (Oral)   Resp 18   SpO2 95%   gen: NAD, vitals reviewed  Eyes: fundus sharp with no papilledema or retinal hemorrhages  HEENT: no nuchal rigidity  CVS: RRR, S1, S2  MS: oriented x3, recent/remote memory intact, normal attention/concentration, language intact, no neglect, normal fund of knowledge  CN: visual acuity grossly normal, visual fields full, PERRL with no RAPD, in primary position left eye deviates medially, positive cover uncover test, facial sensation equal, no facial droop, hearing symmetric, palate elevates symmetrically, shoulder shrug equal, tongue midline  Motor: 5/5 throughout upper and lower extremities, normal tone  Sensation: intact to vibration and temperature throughout  Reflexes: 2+ throughout upper and lower extremities, downgoing plantars  Coordination: no dysmetria with finger to nose bilaterally  Gait: no ataxia, normal station    DATA:    Lab Results   Component Value Date    GLUCOSE 238 (H) 04/14/2021    CALCIUM 8.8 04/14/2021     04/14/2021    K 4.0 04/14/2021    CO2 23.5 04/14/2021     (H) 04/14/2021    BUN 13 04/14/2021    CREATININE 0.81 04/14/2021    EGFRIFNONA 99 04/14/2021    BCR 16.0 04/14/2021    ANIONGAP 9.5 04/14/2021     Lab Results   Component Value  Date    WBC 6.95 04/14/2021    HGB 14.8 04/14/2021    HCT 42.9 04/14/2021    MCV 96.4 04/14/2021     (L) 04/14/2021     No results found for: LDL  Lab Results   Component Value Date    HGBA1C 7.90 (H) 04/14/2021     Lab Results   Component Value Date    INR 1.07 04/14/2021    PROTIME 13.7 04/14/2021       Lab review: Platelets 138, hemoglobin A1c 7.9    Imaging review: I personally reviewed his outside brain MRI which shows no acute stroke or ICH. MRI brain of the orbits ordered.    Diagnoses:  Diplopia  Orbital pain  History of hyperthyroidism  Non-insulin-dependent diabetes    Comment: I am struggling to localize his deficit but I think this may be a 4th cranial nerve palsy. It is also possible that he has some isolated rectus muscle dysfunction which would correlate more with possible thyroid eye disease. I think the differential here is diabetic nerve palsy versus thyroid ophthalmopathy versus much less likely optic neuritis    PLAN:  -Check MRI orbits with and without contrast  -I do not feel strongly about lumbar puncture at this time unless MRI shows optic nerve enhancement which is unlikely  -Check thyrotropin receptor antibodies  -I agree with getting an ophthalmology opinion. He may ultimately benefit from seeing a neuro-ophthalmologist after discharge    Recommendations discussed with patient. Anticipate discharge tomorrow if MRI is negative and optho eval reassuring

## 2021-04-14 NOTE — PLAN OF CARE
Goal Outcome EvaL  Patient still having diplopia for objects in the distance an he feels vision has gotten slightly blurry. MRI orbits done today. Will DC in AM and have OP opthal appt. VSS. Aox4. Please call opthalmology in AM to make him appt.

## 2021-04-14 NOTE — PLAN OF CARE
Goal Outcome Evaluation:  Plan of Care Reviewed With: patient  Progress: no change  Pt was a direct admission from [facility], alert & oriented x4 with c/o diplopia & headache five days ago, nihss was zero, diplopia was with long distance vision only, vss, on CIWA protocol, I will continue to monitor.

## 2021-04-15 VITALS
SYSTOLIC BLOOD PRESSURE: 107 MMHG | RESPIRATION RATE: 16 BRPM | OXYGEN SATURATION: 94 % | DIASTOLIC BLOOD PRESSURE: 75 MMHG | HEART RATE: 66 BPM | TEMPERATURE: 97.7 F

## 2021-04-15 DIAGNOSIS — H53.2 DIPLOPIA: Primary | ICD-10-CM

## 2021-04-15 LAB
CHOLEST SERPL-MCNC: 149 MG/DL (ref 0–200)
GLUCOSE BLDC GLUCOMTR-MCNC: 190 MG/DL (ref 70–130)
GLUCOSE BLDC GLUCOMTR-MCNC: 199 MG/DL (ref 70–130)
HDLC SERPL-MCNC: 44 MG/DL (ref 40–60)
LDLC SERPL CALC-MCNC: 85 MG/DL (ref 0–100)
LDLC/HDLC SERPL: 1.89 {RATIO}
SARS-COV-2 AB SERPL QL IA: NEGATIVE
TRIGL SERPL-MCNC: 110 MG/DL (ref 0–150)
VLDLC SERPL-MCNC: 20 MG/DL (ref 5–40)

## 2021-04-15 PROCEDURE — 82962 GLUCOSE BLOOD TEST: CPT

## 2021-04-15 PROCEDURE — G0378 HOSPITAL OBSERVATION PER HR: HCPCS

## 2021-04-15 PROCEDURE — 99214 OFFICE O/P EST MOD 30 MIN: CPT | Performed by: NURSE PRACTITIONER

## 2021-04-15 PROCEDURE — 63710000001 INSULIN LISPRO (HUMAN) PER 5 UNITS: Performed by: NURSE PRACTITIONER

## 2021-04-15 RX ORDER — ASPIRIN 81 MG/1
81 TABLET ORAL DAILY
Qty: 30 TABLET | Refills: 0 | Status: SHIPPED | OUTPATIENT
Start: 2021-04-15 | End: 2021-05-15

## 2021-04-15 RX ADMIN — INSULIN LISPRO 2 UNITS: 100 INJECTION, SOLUTION INTRAVENOUS; SUBCUTANEOUS at 09:08

## 2021-04-15 RX ADMIN — Medication 100 MG: at 09:08

## 2021-04-15 RX ADMIN — SODIUM CHLORIDE, PRESERVATIVE FREE 10 ML: 5 INJECTION INTRAVENOUS at 09:08

## 2021-04-15 RX ADMIN — INSULIN LISPRO 2 UNITS: 100 INJECTION, SOLUTION INTRAVENOUS; SUBCUTANEOUS at 12:51

## 2021-04-15 RX ADMIN — Medication 1 TABLET: at 09:08

## 2021-04-15 RX ADMIN — FOLIC ACID 1 MG: 1 TABLET ORAL at 09:08

## 2021-04-15 NOTE — CONSULTS
Diabetes Education  Assessment/Teaching    Patient Name:  Brandon Tavera  YOB: 1965  MRN: [id number]  Admit Date:  4/13/2021      Assessment Date:  4/15/2021    Most Recent Value   General Information    Referral From:  MD Rao order [A1C 7.9%]   Pregnancy Assessment   Diabetes History   What type of diabetes do you have?  Type 2   Length of Diabetes Diagnosis  10 + years   Current DM knowledge  excellent   Have you had diabetes education/teaching in the past?  yes   Do you test your blood sugar at home?  yes   Have you had low blood sugar? (<70mg/dl)  no   Have you had high blood sugar? (>140mg/dl)  no   How would you rate your diabetes control?  good   Have You Felt Down, Depressed or Hopeless?  yes   Have You Felt Little Interest or Pleasure in Doing Things?  yes   What makes it difficult for you to take care of your diabetes or yourself?  right now, having double vision   Education Preferences   What areas of diabetes would you like to learn about?  avoiding high blood sugar, testing my blood sugar at home, medications for diabetes   Nutrition Information   Assessment Topics   Taking Medication - Assessment  Needs education   Monitoring - Assessment  Needs education   DM Goals   Taking Medication - Goal  0-7 days from discharge   Monitoring - Goal  0-7 days from discharge            Most Recent Value   DM Education Needs   Meter  Has own   Medication  Insulin, Side effects, Administration, Pen   Physical Activity  Walking   Physical Activity Frequency  Regularly   Healthy Coping  Appropriate   Discharge Plan  Home   Motivation  Engaged   Teaching Method  Discussion, Explanation, Teach back, Handouts   Patient Response  Verbalized understanding            Other Comments:  Discussed with pt and his sister insulin therapy. Pt has been on insulin in past ans is also on Trulicity.We also discussed pt looking into a CGM to better monitor his BG.        Electronically signed by:  Lacy Valentine  RN  04/15/21 12:07 EDT

## 2021-04-15 NOTE — DISCHARGE SUMMARY
[facility]  [phone]    DISCHARGE SUMMARY  [facility]    Patient Identification:  Name: Brandon Tavera  Age: 56 y.o.  Sex: male  :  1965  MRN: [id number]  Primary Care Physician: Diana, No Known    Admit date: 2021  Discharge date and time:      Discharge Diagnoses:  Blurred vision, bilateral    Type 2 diabetes mellitus with hyperglycemia, without long-term current use of insulin (CMS/[facility])    Blurred vision       History of present illness from H&P:    Mr. Tavera is a 56-year-old male with history of type 2 diabetes and asthma who presented to the emergency room at [facility] with complaints of diplopia for the past 5 days, it is not getting worse, but is not improved.  He has had intermittent headaches located behind his eye and across his forehead.  He states nothing seems to make it worse or better.  He has not had recent eye exam in the past 2 years, he does wear glasses.  Patient states when he closes one eye he does not have blurred vision or double vision, but with both eyes open he has blurred/double vision.  He denies any trouble with speech, no other neuro deficits, no weakness or numbness and tingling in any extremities.  At [facility] patient had CTA of his head and neck per recommendation of neurology, CT did show a very tiny aneurysm that neurology thought would not be causing his double vision, he also had MRI of his head that showed no acute infarct or intracranial hemorrhage.  The emergency room provider at [facility] also performed eye exam that showed intraocular pressure on his right eye was 24 mmHg and his left eye was 20 mmHg.  The pressures were discussed with Dr. Myrick with neurology who recommend transfer patient here to [facility] for ophthalmology to evaluate.  Vital signs and lab work about  Ramsay were all stable.  Patient does state he drinks a  cocktail nightly before bed.    Hospital Course:     This is a 56-year-old gentleman with a history of type 2 diabetes.  Patient states he has had gradual weight loss over the last couple years of approximately 100 pounds.  Patient over the last few days has had worsening double vision.  Patient had a work-up at [facility] which included CT angio and MRI which were unremarkable for strokes.    Patient states his blood sugars have been 200-300 in the afternoons at home over the last several weeks.  Patient's vision close off is normal vision at about 6 feet and beyond starts to get blurred.  Patient is concerned that his blurred vision could be related to his elevated blood sugars.  We will start the patient on Lantus on discharge.  Hopefully as we get the patient's blood sugars under better control we will start to see improvement in his blurred vision.    As noted patient had slightly elevated intraocular pressures and he is to follow-up with ophthalmology today about this.  He does have some mild eye pain more in the right than the left but not severe.  He also has some slight headaches as well.    Patient is neurologically intact.  With normal movement of arms and legs and no sensation changes.  He does have slight right abnormality in his right eye movements but he states this is chronic in nature.    The patient was seen and examined on the day of discharge.    Consults:   Consults     Date and Time Order Name Status Description    4/14/2021 12:07 AM Inpatient Ophthalmology Consult      4/14/2021 12:07 AM Inpatient Neurology Consult General Completed           Results from last 7 days   Lab Units 04/14/21  0459   WBC 10*3/mm3 6.95   HEMOGLOBIN g/dL 14.8   HEMATOCRIT % 42.9   PLATELETS 10*3/mm3 138*       Results from last 7 days   Lab Units 04/14/21  0459   SODIUM mmol/L 141   POTASSIUM mmol/L 4.0   CHLORIDE mmol/L 108*   CO2 mmol/L 23.5   BUN mg/dL 13   CREATININE mg/dL 0.81   GLUCOSE mg/dL 238*    CALCIUM mg/dL 8.8       Significant Diagnostic Studies:   WBC   Date Value Ref Range Status   04/14/2021 6.95 3.40 - 10.80 10*3/mm3 Final     Hemoglobin   Date Value Ref Range Status   04/14/2021 14.8 13.0 - 17.7 g/dL Final     Hematocrit   Date Value Ref Range Status   04/14/2021 42.9 37.5 - 51.0 % Final     Platelets   Date Value Ref Range Status   04/14/2021 138 (L) 140 - 450 10*3/mm3 Final     Sodium   Date Value Ref Range Status   04/14/2021 141 136 - 145 mmol/L Final     Potassium   Date Value Ref Range Status   04/14/2021 4.0 3.5 - 5.2 mmol/L Final     Chloride   Date Value Ref Range Status   04/14/2021 108 (H) 98 - 107 mmol/L Final     CO2   Date Value Ref Range Status   04/14/2021 23.5 22.0 - 29.0 mmol/L Final     BUN   Date Value Ref Range Status   04/14/2021 13 6 - 20 mg/dL Final     Creatinine   Date Value Ref Range Status   04/14/2021 0.81 0.76 - 1.27 mg/dL Final     Glucose   Date Value Ref Range Status   04/14/2021 238 (H) 65 - 99 mg/dL Final     Calcium   Date Value Ref Range Status   04/14/2021 8.8 8.6 - 10.5 mg/dL Final     Magnesium   Date Value Ref Range Status   04/14/2021 2.1 1.6 - 2.6 mg/dL Final     AST (SGOT)   Date Value Ref Range Status   04/13/2021 27 1 - 40 U/L Final     Comment:     Slight hemolysis detected by analyzer. Results may be affected.     ALT (SGPT)   Date Value Ref Range Status   04/13/2021 45 (H) 1 - 41 U/L Final     Alkaline Phosphatase   Date Value Ref Range Status   04/13/2021 87 39 - 117 U/L Final     INR   Date Value Ref Range Status   04/14/2021 1.07 0.90 - 1.10 Final     Color, UA   Date Value Ref Range Status   04/13/2021 Yellow Yellow, Straw Final     Appearance, UA   Date Value Ref Range Status   04/13/2021 Clear Clear Final     pH, UA   Date Value Ref Range Status   04/13/2021 6.0 4.5 - 8.0 Final     Glucose, UA   Date Value Ref Range Status   04/13/2021 Negative Negative Final     Ketones, UA   Date Value Ref Range Status   04/13/2021 Negative Negative Final  "    Blood, UA   Date Value Ref Range Status   04/13/2021 Negative Negative Final     Leuk Esterase, UA   Date Value Ref Range Status   04/13/2021 Negative Negative Final     Bilirubin, UA   Date Value Ref Range Status   04/13/2021 Negative Negative Final     Urobilinogen, UA   Date Value Ref Range Status   04/13/2021 0.2 E.U./dL 0.2 - 1.0 E.U./dL Final     Troponin T   Date Value Ref Range Status   04/13/2021 <0.010 0.000 - 0.030 ng/mL Final     No components found for: HGBA1C;2  No components found for: TSH;2    Imaging Results (All)     Procedure Component Value Units Date/Time    MRI Orbit Face Neck With & Without Contrast [[id number]] Collected: 04/14/21 2000     Updated: 04/14/21 2016    Narrative:      MRI ORBIT FACE NECK W WO CONTRAST-     CLINICAL HISTORY: 56-year-old diabetic male with history of new onset  diplopia. Patient states prior history of \"overactive thyroid\".     TECHNIQUE: Multiple axial and coronal T1 and T2-weighted images were  obtained through the orbits. T1-weighted images were also acquired after  intravenous injection of MultiHance contrast.      COMPARISON: None currently available     FINDINGS: The images are somewhat degraded due to metallic  susceptibility artifact from a metallic foreign body ( a BB) in the  patient's left cheek. In particular, this significantly impaired fat  suppression. The extraocular muscles and optic nerves are symmetric, and  appear within normal limits. There is no evidence of Grave's  ophthalmopathy. The optic nerves appear within normal limits. No  abnormal enhancement is identified in either optic nerve. The optic  chiasm is unremarkable. No masses are evident within the orbits or sella  turcica.  .    Impression:      Suboptimal study due to artifact as noted. Unremarkable MRI  of the orbits.     This report was finalized on 4/14/2021 8:13 PM by Dr. Ignacio Pulido M.D.         No results found for: SITE, ALLENTEST, PHART, OYX4NHA, PO2ART, MSI5QNC, " BASEEXCESS, E7BDXDVM, HGBBG, HCTABG, OXYHEMOGLOBI, METHHGBN, CARBOXYHGB, CO2CT, BAROMETRIC, MODALITY, FIO2       Discharge Medications      New Medications      Instructions Start Date   Aspirin Low Dose 81 MG EC tablet  Generic drug: aspirin   81 mg, Oral, Daily      B-D UF III MINI PEN NEEDLES 31G X 5 MM misc  Generic drug: Insulin Pen Needle   use with insulin injections once daily.      Lantus SoloStar 100 UNIT/ML injection pen  Generic drug: Insulin Glargine   15 Units, Subcutaneous, Daily         Continue These Medications      Instructions Start Date   albuterol 1.25 MG/3ML nebulizer solution  Commonly known as: ACCUNEB   1 ampule, Nebulization, Every 6 Hours PRN      ipratropium-albuterol  MCG/ACT inhaler  Commonly known as: COMBIVENT RESPIMAT   1 puff, Inhalation, 4 Times Daily PRN      SITagliptin 100 MG tablet  Commonly known as: JANUVIA   100 mg, Oral, Nightly      TRULICITY SC   1.3 Int'l Units/mL, Subcutaneous, Weekly, Due every wednesdays               Patient Instructions:       No future appointments.     Follow-up Information     Provider, No Known .    Contact information:  [facility] [address] [phone]             Radha Fisher MD. Go to.    Specialty: Ophthalmology  Why: Appointment today  Contact information:  [facility] [facility] [address] [place] [id number] [phone]                   Discharge Order (From admission, onward)     Start     Ordered    04/15/21 1031  Discharge patient  Once     Comments: I ok with neurology   Expected Discharge Date: 04/15/21    Discharge Disposition: Home or Self Care    Physician of Record for Attribution - Please select from Treatment Team: EVERARDO BRAND [[id number]]    Review needed by CMO to determine Physician of Record: No       Question Answer Comment   Physician of Record for Attribution - Please select from Treatment Team EVERARDO BRAND    Review needed by CMO to determine Physician of Record No        04/15/21  1040                Diet Order   Procedures   • Diet Regular; Consistent Carbohydrate       TEST  RESULTS PENDING AT DISCHARGE  Pending Labs     Order Current Status    Thyrotropin Receptor Antibody In process            Discharge instructions:  Follow up with your primary care provider in 1-2 weeks with a cbc and cmp         Total time spent discharging patient including evaluation, post hospitalization follow up,  medication and post hospitalization instructions and education, total time exceeds 30 minutes.    Signed:  Yuriy Krueger MD  4/15/2021  11:53 EDT
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
## 2021-04-15 NOTE — PROGRESS NOTES
Case Management Discharge Note      Final Note: DC home         Selected Continued Care - Discharged on 4/15/2021 Admission date: 4/13/2021 - Discharge disposition: Home or Self Care    Destination    No services have been selected for the patient.              Durable Medical Equipment    No services have been selected for the patient.              Dialysis/Infusion    No services have been selected for the patient.              Home Medical Care    No services have been selected for the patient.              Therapy    No services have been selected for the patient.              Community Resources    No services have been selected for the patient.                       Final Discharge Disposition Code: 01 - home or self-care

## 2021-04-15 NOTE — PROGRESS NOTES
Continued Stay Note  [facility]     Patient Name: Brandon Tavera  MRN: [id number]  Today's Date: 4/15/2021    Admit Date: 4/13/2021    Discharge Plan     Row Name 04/15/21 1110       Plan    Plan  Home    Plan Comments  Pt starting on new insulin.  S/W pt at bedside.  He states he has been on insulin before, so he is comfortable administering to himself.  Call to [facility]/ [facility] - the insulin is covered in full.  Pt notified. ......sk        Discharge Codes    No documentation.       Expected Discharge Date and Time     Expected Discharge Date Expected Discharge Time    Apr 15, 2021             Paris Head RN

## 2021-04-15 NOTE — PLAN OF CARE
Problem: Adult Inpatient Plan of Care  Goal: Plan of Care Review  Outcome: Ongoing, Progressing  Goal: Patient-Specific Goal (Individualized)  Outcome: Ongoing, Progressing  Goal: Absence of Hospital-Acquired Illness or Injury  Outcome: Ongoing, Progressing  Intervention: Identify and Manage Fall Risk  Intervention: Prevent Skin Injury  Intervention: Prevent and Manage VTE (venous thromboembolism) Risk  Intervention: Prevent Infection  Goal: Optimal Comfort and Wellbeing  Outcome: Ongoing, Progressing  Intervention: Provide Person-Centered Care  Goal: Readiness for Transition of Care  Outcome: Ongoing, Progressing   Goal Outcome Evaluation:  Plan of Care Reviewed With: patient  Progress: improving  Outcome Summary: vss overnight. patient continue to have double vision-no changes this shift. pleasant and cooperative with care. home cpap in use while asleep. no falls. rested quietly between care. d/c today

## 2021-04-16 ENCOUNTER — TELEPHONE (OUTPATIENT)
Dept: NEUROLOGY | Facility: CLINIC | Age: 56
End: 2021-04-16

## 2021-04-16 ENCOUNTER — DOCUMENTATION (OUTPATIENT)
Dept: NEUROLOGY | Facility: CLINIC | Age: 56
End: 2021-04-16

## 2021-04-16 ENCOUNTER — NURSE TRIAGE (OUTPATIENT)
Dept: CALL CENTER | Facility: HOSPITAL | Age: 56
End: 2021-04-16

## 2021-04-16 DIAGNOSIS — G93.2 PSEUDOTUMOR: Primary | ICD-10-CM

## 2021-04-16 LAB — TSH RECEP AB SER-ACNC: <1.1 IU/L (ref 0–1.75)

## 2021-04-16 NOTE — TELEPHONE ENCOUNTER
----- Message from Bari Phelps MD sent at 4/16/2021 10:35 AM EDT -----  Regarding: needs outpatient LP, follow up  Edwin Maddox,    I spoke with the ophthalmologist we referred this patient to (for diplopia) who recommended we obtain a lumbar puncture to rule out pseudotumor. Can you order an LP under IR with opening and closing pressures? I tried but I couldn't get the right order to come up under outpatient orders. Other studies would be cell count with diff, protein, glucose, CSF culture, MS profile. He also needs a clinic follow up after that. It's fine to put him with me.    Thanks!  Manjinder

## 2021-04-16 NOTE — TELEPHONE ENCOUNTER
Orders placed for LP.  Called patient to inform him that LP was placed and that the hospital will call him to schedule.  Patient's sister answered and stated that patient was driving at the time of the call.  Patient stated it was okay to speak with his sister.      Patient and sister were very insistent patient be scheduled as soon as possible.  Scheduled for 5/5/21.  Informed them that we do not know how far out the hospital is scheduling for lumbar punctures at this time, and that we may not have all of the labs results back depending on when the procedure is performed.     Mailed appt information with paperwork.

## 2021-04-16 NOTE — TELEPHONE ENCOUNTER
"Saw Dr. Fisher  Concerned about brain pressure    Reason for Disposition  • General information question, no triage required and triager able to answer question    Additional Information  • Negative: [1] Caller is not with the adult (patient) AND [2] reporting urgent symptoms  • Negative: Lab result questions  • Negative: Medication questions  • Negative: Caller can't be reached by phone  • Negative: Caller has already spoken to PCP or another triager  • Negative: RN needs further essential information from caller in order to complete triage  • Negative: Requesting regular office appointment  • Negative: [1] Caller requesting NON-URGENT health information AND [2] PCP's office is the best resource  • Negative: Health Information question, no triage required and triager able to answer question    Answer Assessment - Initial Assessment Questions  1. REASON FOR CALL or QUESTION: \"What is your reason for calling today?\" or \"How can I best help you?\" or \"What question do you have that I can help answer?\"      Patient was hospitalized for blurred vision, was to follow up with opththalmologist. Saw Dr Fisher today and she is concerned about brain pressure. He needs follow up. Was seen by hopsitalist and neurology on hospital. Advised to call neurology office phone number given to caller.    Protocols used: INFORMATION ONLY CALL - NO TRIAGE-ADULT-      "

## 2021-04-16 NOTE — TELEPHONE ENCOUNTER
----- Message from GLADYS Walls sent at 4/16/2021  2:02 PM EDT -----  Regarding: RE: neuro opth referral  Yes please.    Eloise  ----- Message -----  From: Tameka Bocanegra MA  Sent: 4/16/2021   1:11 PM EDT  To: GLADYS Walls  Subject: neuro opth referral                              You asked me to place a referral to Dr Johnie France yesterday and I placed it. There is a documentation in chart that Dr Phelps spoke with Dr Fisher with [facility] today. Do you still want pt to see Dr France?       Thank you

## 2021-04-16 NOTE — PROGRESS NOTES
I spoke with Dr. Fisher with [facility] who saw the patient yesterday. 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 She was initially concerned for GCA but I shared with her the his ESR/CRP were negative which we both find reassuring. She also raised the possibility of pseudotumor and recommended LP with opening pressure which we will obtain on an outpatient basis. I will arrange for follow up with our office.

## 2021-04-17 ENCOUNTER — NURSE TRIAGE (OUTPATIENT)
Dept: CALL CENTER | Facility: HOSPITAL | Age: 56
End: 2021-04-17

## 2021-04-17 NOTE — TELEPHONE ENCOUNTER
"Caller states that her brother was recently released from [facility] with diplopia.  He is awaiting an opthamology appt and a shceduled LP.  She states that his diplopia is considerably worse today and does not know what to do.  Recommended going to ER for evaluation.  Reason for Disposition  • Health Information question, no triage required and triager able to answer question    Additional Information  • Negative: [1] Caller is not with the adult (patient) AND [2] reporting urgent symptoms  • Negative: Lab result questions  • Negative: Medication questions  • Negative: Caller can't be reached by phone  • Negative: Caller has already spoken to PCP or another triager  • Negative: RN needs further essential information from caller in order to complete triage  • Negative: Requesting regular office appointment  • Negative: [1] Caller requesting NON-URGENT health information AND [2] PCP's office is the best resource    Answer Assessment - Initial Assessment Questions  1. REASON FOR CALL or QUESTION: \"What is your reason for calling today?\" or \"How can I best help you?\" or \"What question do you have that I can help answer?\"      Caller states that her brother was recently discharged from [facility] and is awaiting an appt with opthamology and a LP.  Today his vision is considerably worse.    Protocols used: INFORMATION ONLY CALL - NO TRIAGE-ADULT-      "

## 2021-04-19 ENCOUNTER — HOSPITAL ENCOUNTER (EMERGENCY)
Facility: HOSPITAL | Age: 56
Discharge: ANOTHER HOSPITAL | End: 2021-04-19
Attending: EMERGENCY MEDICINE | Admitting: EMERGENCY MEDICINE

## 2021-04-19 ENCOUNTER — HOSPITAL ENCOUNTER (OUTPATIENT)
Facility: HOSPITAL | Age: 56
Setting detail: OBSERVATION
Discharge: HOME | End: 2021-04-21
Attending: INTERNAL MEDICINE | Admitting: HOSPITALIST

## 2021-04-19 ENCOUNTER — APPOINTMENT (OUTPATIENT)
Dept: CT IMAGING | Facility: HOSPITAL | Age: 56
End: 2021-04-19

## 2021-04-19 VITALS
BODY MASS INDEX: 31.83 KG/M2 | RESPIRATION RATE: 16 BRPM | SYSTOLIC BLOOD PRESSURE: 123 MMHG | DIASTOLIC BLOOD PRESSURE: 74 MMHG | WEIGHT: 235 LBS | HEIGHT: 72 IN | HEART RATE: 94 BPM | TEMPERATURE: 99.4 F | OXYGEN SATURATION: 97 %

## 2021-04-19 DIAGNOSIS — Z79.4 TYPE 2 DIABETES MELLITUS WITH HYPERGLYCEMIA, WITH LONG-TERM CURRENT USE OF INSULIN (HCC): ICD-10-CM

## 2021-04-19 DIAGNOSIS — H53.2 DIPLOPIA: Primary | ICD-10-CM

## 2021-04-19 DIAGNOSIS — E11.65 TYPE 2 DIABETES MELLITUS WITH HYPERGLYCEMIA, WITH LONG-TERM CURRENT USE OF INSULIN (HCC): ICD-10-CM

## 2021-04-19 DIAGNOSIS — R51.9 NONINTRACTABLE HEADACHE, UNSPECIFIED CHRONICITY PATTERN, UNSPECIFIED HEADACHE TYPE: ICD-10-CM

## 2021-04-19 DIAGNOSIS — E11.65 TYPE 2 DIABETES MELLITUS WITH HYPERGLYCEMIA, WITHOUT LONG-TERM CURRENT USE OF INSULIN (HCC): Primary | ICD-10-CM

## 2021-04-19 LAB
ALBUMIN SERPL-MCNC: 4.4 G/DL (ref 3.5–5.2)
ALBUMIN/GLOB SERPL: 1.4 G/DL
ALP SERPL-CCNC: 86 U/L (ref 39–117)
ALT SERPL W P-5'-P-CCNC: 52 U/L (ref 1–41)
ANION GAP SERPL CALCULATED.3IONS-SCNC: 9.3 MMOL/L (ref 5–15)
APTT PPP: 30.2 SECONDS (ref 24.3–38.1)
AST SERPL-CCNC: 31 U/L (ref 1–40)
BASOPHILS # BLD AUTO: 0.03 10*3/MM3 (ref 0–0.2)
BASOPHILS NFR BLD AUTO: 0.4 % (ref 0–1.5)
BILIRUB SERPL-MCNC: 0.4 MG/DL (ref 0–1.2)
BUN SERPL-MCNC: 12 MG/DL (ref 6–20)
BUN/CREAT SERPL: 14.6 (ref 7–25)
CALCIUM SPEC-SCNC: 9.3 MG/DL (ref 8.6–10.5)
CHLORIDE SERPL-SCNC: 100 MMOL/L (ref 98–107)
CO2 SERPL-SCNC: 24.7 MMOL/L (ref 22–29)
CREAT SERPL-MCNC: 0.82 MG/DL (ref 0.76–1.27)
DEPRECATED RDW RBC AUTO: 40.8 FL (ref 37–54)
EOSINOPHIL # BLD AUTO: 0.24 10*3/MM3 (ref 0–0.4)
EOSINOPHIL NFR BLD AUTO: 3.2 % (ref 0.3–6.2)
ERYTHROCYTE [DISTWIDTH] IN BLOOD BY AUTOMATED COUNT: 11.9 % (ref 12.3–15.4)
GFR SERPL CREATININE-BSD FRML MDRD: 98 ML/MIN/1.73
GLOBULIN UR ELPH-MCNC: 3.1 GM/DL
GLUCOSE BLDC GLUCOMTR-MCNC: 242 MG/DL (ref 70–130)
GLUCOSE BLDC GLUCOMTR-MCNC: 283 MG/DL (ref 70–130)
GLUCOSE SERPL-MCNC: 317 MG/DL (ref 65–99)
HCT VFR BLD AUTO: 44.4 % (ref 37.5–51)
HGB BLD-MCNC: 15.2 G/DL (ref 13–17.7)
IMM GRANULOCYTES # BLD AUTO: 0.04 10*3/MM3 (ref 0–0.05)
IMM GRANULOCYTES NFR BLD AUTO: 0.5 % (ref 0–0.5)
INR PPP: 1.07 (ref 0.9–1.1)
LYMPHOCYTES # BLD AUTO: 2.11 10*3/MM3 (ref 0.7–3.1)
LYMPHOCYTES NFR BLD AUTO: 27.8 % (ref 19.6–45.3)
MCH RBC QN AUTO: 32.6 PG (ref 26.6–33)
MCHC RBC AUTO-ENTMCNC: 34.2 G/DL (ref 31.5–35.7)
MCV RBC AUTO: 95.3 FL (ref 79–97)
MONOCYTES # BLD AUTO: 0.79 10*3/MM3 (ref 0.1–0.9)
MONOCYTES NFR BLD AUTO: 10.4 % (ref 5–12)
NEUTROPHILS NFR BLD AUTO: 4.39 10*3/MM3 (ref 1.7–7)
NEUTROPHILS NFR BLD AUTO: 57.7 % (ref 42.7–76)
NRBC BLD AUTO-RTO: 0 /100 WBC (ref 0–0.2)
PLATELET # BLD AUTO: 156 10*3/MM3 (ref 140–450)
PMV BLD AUTO: 10.9 FL (ref 6–12)
POTASSIUM SERPL-SCNC: 4.4 MMOL/L (ref 3.5–5.2)
PROT SERPL-MCNC: 7.5 G/DL (ref 6–8.5)
PROTHROMBIN TIME: 13.6 SECONDS (ref 12.1–15)
RBC # BLD AUTO: 4.66 10*6/MM3 (ref 4.14–5.8)
SODIUM SERPL-SCNC: 134 MMOL/L (ref 136–145)
WBC # BLD AUTO: 7.6 10*3/MM3 (ref 3.4–10.8)

## 2021-04-19 PROCEDURE — 82962 GLUCOSE BLOOD TEST: CPT

## 2021-04-19 PROCEDURE — 85610 PROTHROMBIN TIME: CPT | Performed by: EMERGENCY MEDICINE

## 2021-04-19 PROCEDURE — 80053 COMPREHEN METABOLIC PANEL: CPT | Performed by: EMERGENCY MEDICINE

## 2021-04-19 PROCEDURE — 85025 COMPLETE CBC W/AUTO DIFF WBC: CPT | Performed by: EMERGENCY MEDICINE

## 2021-04-19 PROCEDURE — G0378 HOSPITAL OBSERVATION PER HR: HCPCS

## 2021-04-19 PROCEDURE — 85730 THROMBOPLASTIN TIME PARTIAL: CPT | Performed by: EMERGENCY MEDICINE

## 2021-04-19 PROCEDURE — 99285 EMERGENCY DEPT VISIT HI MDM: CPT | Performed by: EMERGENCY MEDICINE

## 2021-04-19 PROCEDURE — 70450 CT HEAD/BRAIN W/O DYE: CPT

## 2021-04-19 PROCEDURE — 99283 EMERGENCY DEPT VISIT LOW MDM: CPT

## 2021-04-19 RX ORDER — BUDESONIDE AND FORMOTEROL FUMARATE DIHYDRATE 160; 4.5 UG/1; UG/1
2 AEROSOL RESPIRATORY (INHALATION)
COMMUNITY

## 2021-04-19 RX ORDER — NAPROXEN SODIUM 220 MG
220 TABLET ORAL 2 TIMES DAILY PRN
COMMUNITY
End: 2021-04-21 | Stop reason: HOSPADM

## 2021-04-19 RX ORDER — SODIUM CHLORIDE 0.9 % (FLUSH) 0.9 %
10 SYRINGE (ML) INJECTION AS NEEDED
Status: DISCONTINUED | OUTPATIENT
Start: 2021-04-19 | End: 2021-04-19 | Stop reason: HOSPADM

## 2021-04-19 NOTE — ED NOTES
No beds avail at [facility], awaiting discharges, pt aware of delay.     Phong Lechuga, RN  04/19/21 4736

## 2021-04-19 NOTE — ED NOTES
[facility] bedboard called, awaiting a bed/report/transfer.     Phong Lechuga, RN  04/19/21 8440

## 2021-04-19 NOTE — ED PROVIDER NOTES
Subjective   History of Present Illness  History of Present Illness    Chief complaint: Double vision, headache    Location: Vision changes to both eyes.  Headache changing locations throughout the frontal and temporal head    Quality/Severity: Moderate aching pain    Timing/Duration: Ongoing for several weeks now    Modifying Factors: None    Narrative: This patient returns to our facility for evaluation of persistent double vision and headache symptoms.  He was recently seen here and transferred to [facility] for consultation by neurology with multiple diagnostics performed.  He also had a follow-up visit with a ophthalmologist last week as well.  Patient expresses concern now because his double vision seems to be worsening.  In previous weeks he was having double vision for any objects that were approximately 6 feet away from him or farther.  Now, the double vision symptom seems to be affecting him for objects that are even closer, specifically up to 3 feet away.  Additionally, he continues having periodic headaches.  Currently he is having a headache in the right frontotemporal region of the head but he says the pain tends to move around to different areas of the head at various times.    Associated Symptoms: As above    Review of Systems   Constitutional: Negative for activity change and fever.   HENT: Negative for facial swelling.    Eyes: Positive for visual disturbance. Negative for photophobia, pain and discharge.   Respiratory: Negative for shortness of breath.    Cardiovascular: Negative for chest pain.   Gastrointestinal: Negative for abdominal pain and vomiting.   Skin: Negative for color change and wound.   Neurological: Positive for headaches. Negative for syncope.   All other systems reviewed and are negative.      Past Medical History:   Diagnosis Date   • Diabetes mellitus (CMS/[facility])    • Skin cancer        Allergies   Allergen Reactions   • Sulfa Antibiotics Anaphylaxis       Past Surgical  History:   Procedure Laterality Date   • MOHS SURGERY         History reviewed. No pertinent family history.    Social History     Socioeconomic History   • Marital status: Single     Spouse name: Not on file   • Number of children: Not on file   • Years of education: Not on file   • Highest education level: Not on file   Tobacco Use   • Smoking status: Former Smoker   • Smokeless tobacco: Never Used   • Tobacco comment: Pt uses Zyn. 6mg nicotine.   Substance and Sexual Activity   • Alcohol use: Yes     Comment: occ   • Drug use: Never     ED Triage Vitals [04/19/21 1132]   Temp Heart Rate Resp BP SpO2   99.4 °F (37.4 °C) 104 18 155/89 100 %      Temp src Heart Rate Source Patient Position BP Location FiO2 (%)   Tympanic Monitor Sitting Left arm --     Objective   Physical Exam  Vitals and nursing note reviewed.   Constitutional:       General: He is not in acute distress.     Appearance: He is well-developed and normal weight.      Comments: Pleasant gentleman.  No apparent distress.  Sitting in the chair at the bedside when I arrived.   HENT:      Head: Normocephalic and atraumatic.   Eyes:      General: No scleral icterus.        Right eye: No discharge.         Left eye: No discharge.      Conjunctiva/sclera: Conjunctivae normal.      Pupils: Pupils are equal, round, and reactive to light.      Comments: Nondilated funduscopic exam appears normal bilaterally.  Rudimentary extraocular muscle examination appears to be normal in all directions bilaterally to me.   Cardiovascular:      Rate and Rhythm: Normal rate and regular rhythm.   Pulmonary:      Effort: Pulmonary effort is normal. No respiratory distress.   Musculoskeletal:         General: No deformity. Normal range of motion.      Cervical back: Normal range of motion and neck supple.   Skin:     General: Skin is warm and dry.      Findings: No erythema or rash.   Neurological:      General: No focal deficit present.      Mental Status: He is alert and  oriented to person, place, and time.   Psychiatric:         Mood and Affect: Mood normal.         Behavior: Behavior normal.         Thought Content: Thought content normal.         Judgment: Judgment normal.       Results for orders placed or performed during the hospital encounter of 04/19/21   Comprehensive Metabolic Panel    Specimen: Blood   Result Value Ref Range    Glucose 317 (H) 65 - 99 mg/dL    BUN 12 6 - 20 mg/dL    Creatinine 0.82 0.76 - 1.27 mg/dL    Sodium 134 (L) 136 - 145 mmol/L    Potassium 4.4 3.5 - 5.2 mmol/L    Chloride 100 98 - 107 mmol/L    CO2 24.7 22.0 - 29.0 mmol/L    Calcium 9.3 8.6 - 10.5 mg/dL    Total Protein 7.5 6.0 - 8.5 g/dL    Albumin 4.40 3.50 - 5.20 g/dL    ALT (SGPT) 52 (H) 1 - 41 U/L    AST (SGOT) 31 1 - 40 U/L    Alkaline Phosphatase 86 39 - 117 U/L    Total Bilirubin 0.4 0.0 - 1.2 mg/dL    eGFR Non African Amer 98 >60 mL/min/1.73    Globulin 3.1 gm/dL    A/G Ratio 1.4 g/dL    BUN/Creatinine Ratio 14.6 7.0 - 25.0    Anion Gap 9.3 5.0 - 15.0 mmol/L   Protime-INR    Specimen: Blood   Result Value Ref Range    Protime 13.6 12.1 - 15.0 Seconds    INR 1.07 0.90 - 1.10   aPTT    Specimen: Blood   Result Value Ref Range    PTT 30.2 24.3 - 38.1 seconds   CBC Auto Differential    Specimen: Blood   Result Value Ref Range    WBC 7.60 3.40 - 10.80 10*3/mm3    RBC 4.66 4.14 - 5.80 10*6/mm3    Hemoglobin 15.2 13.0 - 17.7 g/dL    Hematocrit 44.4 37.5 - 51.0 %    MCV 95.3 79.0 - 97.0 fL    MCH 32.6 26.6 - 33.0 pg    MCHC 34.2 31.5 - 35.7 g/dL    RDW 11.9 (L) 12.3 - 15.4 %    RDW-SD 40.8 37.0 - 54.0 fl    MPV 10.9 6.0 - 12.0 fL    Platelets 156 140 - 450 10*3/mm3    Neutrophil % 57.7 42.7 - 76.0 %    Lymphocyte % 27.8 19.6 - 45.3 %    Monocyte % 10.4 5.0 - 12.0 %    Eosinophil % 3.2 0.3 - 6.2 %    Basophil % 0.4 0.0 - 1.5 %    Immature Grans % 0.5 0.0 - 0.5 %    Neutrophils, Absolute 4.39 1.70 - 7.00 10*3/mm3    Lymphocytes, Absolute 2.11 0.70 - 3.10 10*3/mm3    Monocytes, Absolute 0.79 0.10 -  0.90 10*3/mm3    Eosinophils, Absolute 0.24 0.00 - 0.40 10*3/mm3    Basophils, Absolute 0.03 0.00 - 0.20 10*3/mm3    Immature Grans, Absolute 0.04 0.00 - 0.05 10*3/mm3    nRBC 0.0 0.0 - 0.2 /100 WBC     RADIOLOGY        Study: CT head without contrast    Findings: No acute intracranial process or significant change from prior head CT.    Interpreted contemporaneously with treatment by Dr. Morin, independently viewed by me    Procedures           ED Course  ED Course as of Apr 19 1600   Mon Apr 19, 2021   1255 I reviewed the labs and CT report from today's visit.  Patient is again hyperglycemic.  He says he has not used his insulin dosage today.  I advised him to go ahead and self administer his usual Lantus dose since he has his medication with him.  He and his sister seem to be quite concerned that his diplopia symptoms are worsening.  I do not have a good explanation for this visual complaint of his.  I talked with the neurologist at [facility].  He agrees to accept patient to their facility for continuity of care so that they can expedite the LP procedure.  Patient is agreeable with this plan.    [CLEOPATRA]      ED Course User Index  [CLEOPATRA] Tray Martin MD                                           MDM  Number of Diagnoses or Management Options     Amount and/or Complexity of Data Reviewed  Clinical lab tests: reviewed and ordered  Tests in the radiology section of CPT®: reviewed and ordered  Decide to obtain previous medical records or to obtain history from someone other than the patient: yes  Review and summarize past medical records: yes  Independent visualization of images, tracings, or specimens: yes    Risk of Complications, Morbidity, and/or Mortality  Presenting problems: moderate  Diagnostic procedures: moderate  Management options: moderate         Final diagnoses:   Diplopia   Nonintractable headache, unspecified chronicity pattern, unspecified headache type   Type 2 diabetes mellitus with hyperglycemia,  with long-term current use of insulin (CMS/[facility])       ED Disposition  ED Disposition     ED Disposition Condition Comment    Transfer to Another Facility   [facility] - Telemetry Bed          No follow-up provider specified.       Medication List      No changes were made to your prescriptions during this visit.          Tray Martin MD  04/19/21 1600

## 2021-04-19 NOTE — ED NOTES
Food tray provided, pt aware of delay in admission/transfer.     Phong Lechuga, RN  04/19/21 3702

## 2021-04-20 ENCOUNTER — APPOINTMENT (OUTPATIENT)
Dept: CT IMAGING | Facility: HOSPITAL | Age: 56
End: 2021-04-20

## 2021-04-20 ENCOUNTER — APPOINTMENT (OUTPATIENT)
Dept: GENERAL RADIOLOGY | Facility: HOSPITAL | Age: 56
End: 2021-04-20

## 2021-04-20 ENCOUNTER — APPOINTMENT (OUTPATIENT)
Dept: MRI IMAGING | Facility: HOSPITAL | Age: 56
End: 2021-04-20

## 2021-04-20 ENCOUNTER — APPOINTMENT (OUTPATIENT)
Dept: NEUROLOGY | Facility: HOSPITAL | Age: 56
End: 2021-04-20

## 2021-04-20 PROBLEM — H53.2 DIPLOPIA: Status: ACTIVE | Noted: 2021-04-20

## 2021-04-20 PROBLEM — H53.2 DOUBLE VISION: Status: ACTIVE | Noted: 2021-04-20

## 2021-04-20 PROBLEM — G47.33 OSA (OBSTRUCTIVE SLEEP APNEA): Status: ACTIVE | Noted: 2021-04-20

## 2021-04-20 LAB
AMPHET+METHAMPHET UR QL: NEGATIVE
ANION GAP SERPL CALCULATED.3IONS-SCNC: 12.5 MMOL/L (ref 5–15)
APPEARANCE CSF: CLEAR
APPEARANCE CSF: CLEAR
BARBITURATES UR QL SCN: NEGATIVE
BENZODIAZ UR QL SCN: NEGATIVE
BUN SERPL-MCNC: 14 MG/DL (ref 6–20)
BUN/CREAT SERPL: 19.7 (ref 7–25)
CALCIUM SPEC-SCNC: 9 MG/DL (ref 8.6–10.5)
CANNABINOIDS SERPL QL: NEGATIVE
CHLORIDE SERPL-SCNC: 102 MMOL/L (ref 98–107)
CK SERPL-CCNC: 75 U/L (ref 20–200)
CO2 SERPL-SCNC: 24.5 MMOL/L (ref 22–29)
COCAINE UR QL: NEGATIVE
COLOR CSF: COLORLESS
COLOR CSF: COLORLESS
CREAT SERPL-MCNC: 0.71 MG/DL (ref 0.76–1.27)
DEPRECATED RDW RBC AUTO: 39.9 FL (ref 37–54)
ERYTHROCYTE [DISTWIDTH] IN BLOOD BY AUTOMATED COUNT: 11.6 % (ref 12.3–15.4)
GFR SERPL CREATININE-BSD FRML MDRD: 116 ML/MIN/1.73
GLUCOSE BLDC GLUCOMTR-MCNC: 151 MG/DL (ref 70–130)
GLUCOSE BLDC GLUCOMTR-MCNC: 199 MG/DL (ref 70–130)
GLUCOSE BLDC GLUCOMTR-MCNC: 216 MG/DL (ref 70–130)
GLUCOSE BLDC GLUCOMTR-MCNC: 251 MG/DL (ref 70–130)
GLUCOSE CSF-MCNC: 141 MG/DL (ref 40–70)
GLUCOSE SERPL-MCNC: 224 MG/DL (ref 65–99)
HCT VFR BLD AUTO: 42.5 % (ref 37.5–51)
HGB BLD-MCNC: 14.8 G/DL (ref 13–17.7)
HOLD SPECIMEN: NORMAL
MCH RBC QN AUTO: 33.4 PG (ref 26.6–33)
MCHC RBC AUTO-ENTMCNC: 34.8 G/DL (ref 31.5–35.7)
MCV RBC AUTO: 95.9 FL (ref 79–97)
METHADONE UR QL SCN: NEGATIVE
METHOD: ABNORMAL
METHOD: ABNORMAL
NUC CELL # CSF MANUAL: 1 /MM3 (ref 0–5)
NUC CELL # CSF MANUAL: 1 /MM3 (ref 0–5)
OPIATES UR QL: NEGATIVE
OXYCODONE UR QL SCN: NEGATIVE
PLATELET # BLD AUTO: 139 10*3/MM3 (ref 140–450)
PMV BLD AUTO: 10.8 FL (ref 6–12)
POTASSIUM SERPL-SCNC: 4 MMOL/L (ref 3.5–5.2)
PROT CSF-MCNC: 39 MG/DL (ref 15–45)
RBC # BLD AUTO: 4.43 10*6/MM3 (ref 4.14–5.8)
RBC # CSF MANUAL: 2 /MM3 (ref 0–0)
RBC # CSF MANUAL: 47 /MM3 (ref 0–0)
SODIUM SERPL-SCNC: 139 MMOL/L (ref 136–145)
TUBE # CSF: 1
TUBE # CSF: 4
WBC # BLD AUTO: 7.33 10*3/MM3 (ref 3.4–10.8)

## 2021-04-20 PROCEDURE — 80307 DRUG TEST PRSMV CHEM ANLYZR: CPT | Performed by: HOSPITALIST

## 2021-04-20 PROCEDURE — 82962 GLUCOSE BLOOD TEST: CPT

## 2021-04-20 PROCEDURE — 87070 CULTURE OTHR SPECIMN AEROBIC: CPT | Performed by: NURSE PRACTITIONER

## 2021-04-20 PROCEDURE — 83519 RIA NONANTIBODY: CPT | Performed by: NURSE PRACTITIONER

## 2021-04-20 PROCEDURE — 94664 DEMO&/EVAL PT USE INHALER: CPT

## 2021-04-20 PROCEDURE — G0378 HOSPITAL OBSERVATION PER HR: HCPCS

## 2021-04-20 PROCEDURE — 25010000002 PROCHLORPERAZINE 10 MG/2ML SOLUTION: Performed by: NURSE PRACTITIONER

## 2021-04-20 PROCEDURE — 63710000001 INSULIN LISPRO (HUMAN) PER 5 UNITS: Performed by: NURSE PRACTITIONER

## 2021-04-20 PROCEDURE — 80048 BASIC METABOLIC PNL TOTAL CA: CPT | Performed by: NURSE PRACTITIONER

## 2021-04-20 PROCEDURE — 82042 OTHER SOURCE ALBUMIN QUAN EA: CPT | Performed by: NURSE PRACTITIONER

## 2021-04-20 PROCEDURE — 94640 AIRWAY INHALATION TREATMENT: CPT

## 2021-04-20 PROCEDURE — 94799 UNLISTED PULMONARY SVC/PX: CPT

## 2021-04-20 PROCEDURE — 82164 ANGIOTENSIN I ENZYME TEST: CPT | Performed by: NURSE PRACTITIONER

## 2021-04-20 PROCEDURE — 85027 COMPLETE CBC AUTOMATED: CPT | Performed by: NURSE PRACTITIONER

## 2021-04-20 PROCEDURE — 96374 THER/PROPH/DIAG INJ IV PUSH: CPT

## 2021-04-20 PROCEDURE — 99214 OFFICE O/P EST MOD 30 MIN: CPT | Performed by: NURSE PRACTITIONER

## 2021-04-20 PROCEDURE — 89050 BODY FLUID CELL COUNT: CPT | Performed by: NURSE PRACTITIONER

## 2021-04-20 PROCEDURE — 87015 SPECIMEN INFECT AGNT CONCNTJ: CPT | Performed by: NURSE PRACTITIONER

## 2021-04-20 PROCEDURE — 87205 SMEAR GRAM STAIN: CPT | Performed by: NURSE PRACTITIONER

## 2021-04-20 PROCEDURE — 86780 TREPONEMA PALLIDUM: CPT | Performed by: NURSE PRACTITIONER

## 2021-04-20 PROCEDURE — 95816 EEG AWAKE AND DROWSY: CPT

## 2021-04-20 PROCEDURE — 82945 GLUCOSE OTHER FLUID: CPT | Performed by: NURSE PRACTITIONER

## 2021-04-20 PROCEDURE — 86617 LYME DISEASE ANTIBODY: CPT | Performed by: NURSE PRACTITIONER

## 2021-04-20 PROCEDURE — 96375 TX/PRO/DX INJ NEW DRUG ADDON: CPT

## 2021-04-20 PROCEDURE — 96361 HYDRATE IV INFUSION ADD-ON: CPT

## 2021-04-20 PROCEDURE — 82040 ASSAY OF SERUM ALBUMIN: CPT | Performed by: NURSE PRACTITIONER

## 2021-04-20 PROCEDURE — 95816 EEG AWAKE AND DROWSY: CPT | Performed by: PSYCHIATRY & NEUROLOGY

## 2021-04-20 PROCEDURE — 82550 ASSAY OF CK (CPK): CPT | Performed by: HOSPITALIST

## 2021-04-20 PROCEDURE — 25010000002 DIPHENHYDRAMINE PER 50 MG: Performed by: NURSE PRACTITIONER

## 2021-04-20 PROCEDURE — 83873 ASSAY OF CSF PROTEIN: CPT | Performed by: NURSE PRACTITIONER

## 2021-04-20 PROCEDURE — 72141 MRI NECK SPINE W/O DYE: CPT

## 2021-04-20 PROCEDURE — 83916 OLIGOCLONAL BANDS: CPT | Performed by: NURSE PRACTITIONER

## 2021-04-20 PROCEDURE — 84157 ASSAY OF PROTEIN OTHER: CPT | Performed by: NURSE PRACTITIONER

## 2021-04-20 PROCEDURE — 0 IOPAMIDOL PER 1 ML: Performed by: HOSPITALIST

## 2021-04-20 PROCEDURE — 70496 CT ANGIOGRAPHY HEAD: CPT

## 2021-04-20 PROCEDURE — 88108 CYTOPATH CONCENTRATE TECH: CPT | Performed by: NURSE PRACTITIONER

## 2021-04-20 PROCEDURE — 86255 FLUORESCENT ANTIBODY SCREEN: CPT | Performed by: NURSE PRACTITIONER

## 2021-04-20 PROCEDURE — 25010000003 LIDOCAINE 1 % SOLUTION: Performed by: RADIOLOGY

## 2021-04-20 PROCEDURE — 82784 ASSAY IGA/IGD/IGG/IGM EACH: CPT | Performed by: NURSE PRACTITIONER

## 2021-04-20 PROCEDURE — 63710000001 INSULIN GLARGINE PER 5 UNITS: Performed by: HOSPITALIST

## 2021-04-20 RX ORDER — BUTALBITAL, ACETAMINOPHEN AND CAFFEINE 50; 325; 40 MG/1; MG/1; MG/1
1 TABLET ORAL ONCE AS NEEDED
Status: DISCONTINUED | OUTPATIENT
Start: 2021-04-20 | End: 2021-04-21 | Stop reason: HOSPADM

## 2021-04-20 RX ORDER — INSULIN LISPRO 100 [IU]/ML
0-9 INJECTION, SOLUTION INTRAVENOUS; SUBCUTANEOUS
Status: DISCONTINUED | OUTPATIENT
Start: 2021-04-20 | End: 2021-04-21 | Stop reason: HOSPADM

## 2021-04-20 RX ORDER — DIPHENHYDRAMINE HYDROCHLORIDE 50 MG/ML
25 INJECTION INTRAMUSCULAR; INTRAVENOUS ONCE
Status: COMPLETED | OUTPATIENT
Start: 2021-04-20 | End: 2021-04-20

## 2021-04-20 RX ORDER — ASPIRIN 81 MG/1
81 TABLET ORAL DAILY
Status: DISCONTINUED | OUTPATIENT
Start: 2021-04-20 | End: 2021-04-21 | Stop reason: HOSPADM

## 2021-04-20 RX ORDER — LIDOCAINE HYDROCHLORIDE 10 MG/ML
10 INJECTION, SOLUTION INFILTRATION; PERINEURAL ONCE
Status: COMPLETED | OUTPATIENT
Start: 2021-04-20 | End: 2021-04-20

## 2021-04-20 RX ORDER — INSULIN GLARGINE 100 [IU]/ML
15 INJECTION, SOLUTION SUBCUTANEOUS NIGHTLY
Status: DISCONTINUED | OUTPATIENT
Start: 2021-04-20 | End: 2021-04-21 | Stop reason: HOSPADM

## 2021-04-20 RX ORDER — SODIUM CHLORIDE 0.9 % (FLUSH) 0.9 %
10 SYRINGE (ML) INJECTION AS NEEDED
Status: DISCONTINUED | OUTPATIENT
Start: 2021-04-20 | End: 2021-04-21 | Stop reason: HOSPADM

## 2021-04-20 RX ORDER — ALBUTEROL SULFATE 1.25 MG/3ML
1 SOLUTION RESPIRATORY (INHALATION) EVERY 6 HOURS PRN
Status: DISCONTINUED | OUTPATIENT
Start: 2021-04-20 | End: 2021-04-21 | Stop reason: HOSPADM

## 2021-04-20 RX ORDER — DEXTROSE MONOHYDRATE 25 G/50ML
25 INJECTION, SOLUTION INTRAVENOUS
Status: DISCONTINUED | OUTPATIENT
Start: 2021-04-20 | End: 2021-04-21 | Stop reason: HOSPADM

## 2021-04-20 RX ORDER — ONDANSETRON 2 MG/ML
4 INJECTION INTRAMUSCULAR; INTRAVENOUS EVERY 6 HOURS PRN
Status: DISCONTINUED | OUTPATIENT
Start: 2021-04-20 | End: 2021-04-21 | Stop reason: HOSPADM

## 2021-04-20 RX ORDER — NITROGLYCERIN 0.4 MG/1
0.4 TABLET SUBLINGUAL
Status: DISCONTINUED | OUTPATIENT
Start: 2021-04-20 | End: 2021-04-21 | Stop reason: HOSPADM

## 2021-04-20 RX ORDER — SODIUM CHLORIDE 9 MG/ML
75 INJECTION, SOLUTION INTRAVENOUS CONTINUOUS
Status: DISCONTINUED | OUTPATIENT
Start: 2021-04-20 | End: 2021-04-21 | Stop reason: HOSPADM

## 2021-04-20 RX ORDER — ACETAMINOPHEN 650 MG/1
650 SUPPOSITORY RECTAL EVERY 4 HOURS PRN
Status: DISCONTINUED | OUTPATIENT
Start: 2021-04-20 | End: 2021-04-21 | Stop reason: HOSPADM

## 2021-04-20 RX ORDER — ACETAMINOPHEN 325 MG/1
650 TABLET ORAL EVERY 4 HOURS PRN
Status: DISCONTINUED | OUTPATIENT
Start: 2021-04-20 | End: 2021-04-21 | Stop reason: HOSPADM

## 2021-04-20 RX ORDER — PROCHLORPERAZINE EDISYLATE 5 MG/ML
10 INJECTION INTRAMUSCULAR; INTRAVENOUS ONCE
Status: COMPLETED | OUTPATIENT
Start: 2021-04-20 | End: 2021-04-20

## 2021-04-20 RX ORDER — NICOTINE POLACRILEX 4 MG
15 LOZENGE BUCCAL
Status: DISCONTINUED | OUTPATIENT
Start: 2021-04-20 | End: 2021-04-21 | Stop reason: HOSPADM

## 2021-04-20 RX ORDER — SODIUM CHLORIDE 0.9 % (FLUSH) 0.9 %
10 SYRINGE (ML) INJECTION EVERY 12 HOURS SCHEDULED
Status: DISCONTINUED | OUTPATIENT
Start: 2021-04-20 | End: 2021-04-21 | Stop reason: HOSPADM

## 2021-04-20 RX ORDER — ACETAMINOPHEN 160 MG/5ML
650 SOLUTION ORAL EVERY 4 HOURS PRN
Status: DISCONTINUED | OUTPATIENT
Start: 2021-04-20 | End: 2021-04-21 | Stop reason: HOSPADM

## 2021-04-20 RX ORDER — BUDESONIDE AND FORMOTEROL FUMARATE DIHYDRATE 160; 4.5 UG/1; UG/1
2 AEROSOL RESPIRATORY (INHALATION)
Status: DISCONTINUED | OUTPATIENT
Start: 2021-04-20 | End: 2021-04-21 | Stop reason: HOSPADM

## 2021-04-20 RX ORDER — INSULIN GLARGINE 100 [IU]/ML
15 INJECTION, SOLUTION SUBCUTANEOUS DAILY
Status: DISCONTINUED | OUTPATIENT
Start: 2021-04-20 | End: 2021-04-20

## 2021-04-20 RX ADMIN — SODIUM CHLORIDE 75 ML/HR: 9 INJECTION, SOLUTION INTRAVENOUS at 21:43

## 2021-04-20 RX ADMIN — LIDOCAINE HYDROCHLORIDE 2 ML: 10 INJECTION, SOLUTION INFILTRATION; PERINEURAL at 11:39

## 2021-04-20 RX ADMIN — IOPAMIDOL 95 ML: 755 INJECTION, SOLUTION INTRAVENOUS at 17:10

## 2021-04-20 RX ADMIN — SODIUM CHLORIDE, PRESERVATIVE FREE 10 ML: 5 INJECTION INTRAVENOUS at 08:19

## 2021-04-20 RX ADMIN — INSULIN LISPRO 4 UNITS: 100 INJECTION, SOLUTION INTRAVENOUS; SUBCUTANEOUS at 13:32

## 2021-04-20 RX ADMIN — PROCHLORPERAZINE EDISYLATE 10 MG: 5 INJECTION INTRAMUSCULAR; INTRAVENOUS at 13:33

## 2021-04-20 RX ADMIN — SODIUM CHLORIDE 75 ML/HR: 9 INJECTION, SOLUTION INTRAVENOUS at 01:31

## 2021-04-20 RX ADMIN — DIPHENHYDRAMINE HYDROCHLORIDE 25 MG: 50 INJECTION, SOLUTION INTRAMUSCULAR; INTRAVENOUS at 13:33

## 2021-04-20 RX ADMIN — BUDESONIDE AND FORMOTEROL FUMARATE DIHYDRATE 2 PUFF: 160; 4.5 AEROSOL RESPIRATORY (INHALATION) at 21:01

## 2021-04-20 RX ADMIN — INSULIN GLARGINE 15 UNITS: 100 INJECTION, SOLUTION SUBCUTANEOUS at 20:34

## 2021-04-20 RX ADMIN — SODIUM CHLORIDE, PRESERVATIVE FREE 10 ML: 5 INJECTION INTRAVENOUS at 01:31

## 2021-04-20 RX ADMIN — BUDESONIDE AND FORMOTEROL FUMARATE DIHYDRATE 2 PUFF: 160; 4.5 AEROSOL RESPIRATORY (INHALATION) at 07:45

## 2021-04-20 RX ADMIN — SODIUM CHLORIDE, PRESERVATIVE FREE 10 ML: 5 INJECTION INTRAVENOUS at 20:34

## 2021-04-21 VITALS
HEIGHT: 72 IN | WEIGHT: 245.4 LBS | HEART RATE: 100 BPM | OXYGEN SATURATION: 97 % | SYSTOLIC BLOOD PRESSURE: 147 MMHG | RESPIRATION RATE: 16 BRPM | BODY MASS INDEX: 33.24 KG/M2 | DIASTOLIC BLOOD PRESSURE: 84 MMHG | TEMPERATURE: 97.3 F

## 2021-04-21 DIAGNOSIS — M48.02 SPINAL STENOSIS IN CERVICAL REGION: Primary | ICD-10-CM

## 2021-04-21 DIAGNOSIS — M79.601 RIGHT ARM PAIN: ICD-10-CM

## 2021-04-21 PROBLEM — M54.2 CERVICAL PAIN: Status: ACTIVE | Noted: 2021-04-21

## 2021-04-21 LAB
GLUCOSE BLDC GLUCOMTR-MCNC: 187 MG/DL (ref 70–130)
GLUCOSE BLDC GLUCOMTR-MCNC: 197 MG/DL (ref 70–130)
GLUCOSE BLDC GLUCOMTR-MCNC: 225 MG/DL (ref 70–130)
OLIGOCLONAL BANDS.IT SER+CSF QL: NORMAL

## 2021-04-21 PROCEDURE — 63710000001 INSULIN LISPRO (HUMAN) PER 5 UNITS: Performed by: NURSE PRACTITIONER

## 2021-04-21 PROCEDURE — 99214 OFFICE O/P EST MOD 30 MIN: CPT | Performed by: NURSE PRACTITIONER

## 2021-04-21 PROCEDURE — 82962 GLUCOSE BLOOD TEST: CPT

## 2021-04-21 PROCEDURE — 99243 OFF/OP CNSLTJ NEW/EST LOW 30: CPT | Performed by: NURSE PRACTITIONER

## 2021-04-21 PROCEDURE — 94799 UNLISTED PULMONARY SVC/PX: CPT

## 2021-04-21 PROCEDURE — G0378 HOSPITAL OBSERVATION PER HR: HCPCS

## 2021-04-21 RX ORDER — METHYLPREDNISOLONE 4 MG/1
4 TABLET ORAL
Status: DISCONTINUED | OUTPATIENT
Start: 2021-04-23 | End: 2021-04-21 | Stop reason: HOSPADM

## 2021-04-21 RX ORDER — TOPIRAMATE 25 MG/1
25 TABLET ORAL EVERY 12 HOURS SCHEDULED
Status: DISCONTINUED | OUTPATIENT
Start: 2021-04-21 | End: 2021-04-21 | Stop reason: HOSPADM

## 2021-04-21 RX ORDER — METHYLPREDNISOLONE 4 MG/1
4 TABLET ORAL
Status: DISCONTINUED | OUTPATIENT
Start: 2021-04-25 | End: 2021-04-21 | Stop reason: HOSPADM

## 2021-04-21 RX ORDER — METHYLPREDNISOLONE 4 MG/1
24 TABLET ORAL ONCE
Status: DISCONTINUED | OUTPATIENT
Start: 2021-04-21 | End: 2021-04-21 | Stop reason: HOSPADM

## 2021-04-21 RX ORDER — TOPIRAMATE 50 MG/1
50 TABLET, FILM COATED ORAL EVERY 12 HOURS SCHEDULED
Status: DISCONTINUED | OUTPATIENT
Start: 2021-04-28 | End: 2021-04-21 | Stop reason: HOSPADM

## 2021-04-21 RX ORDER — METHYLPREDNISOLONE 4 MG/1
TABLET ORAL
Qty: 21 TABLET | Refills: 0 | Status: SHIPPED | OUTPATIENT
Start: 2021-04-21 | End: 2021-04-27

## 2021-04-21 RX ORDER — METHYLPREDNISOLONE 4 MG/1
4 TABLET ORAL
Status: DISCONTINUED | OUTPATIENT
Start: 2021-04-22 | End: 2021-04-21 | Stop reason: HOSPADM

## 2021-04-21 RX ORDER — METHYLPREDNISOLONE 4 MG/1
4 TABLET ORAL
Status: DISCONTINUED | OUTPATIENT
Start: 2021-04-24 | End: 2021-04-21 | Stop reason: HOSPADM

## 2021-04-21 RX ORDER — PROCHLORPERAZINE EDISYLATE 5 MG/ML
10 INJECTION INTRAMUSCULAR; INTRAVENOUS EVERY 6 HOURS PRN
Status: DISCONTINUED | OUTPATIENT
Start: 2021-04-21 | End: 2021-04-21 | Stop reason: HOSPADM

## 2021-04-21 RX ORDER — METHYLPREDNISOLONE 4 MG/1
8 TABLET ORAL
Status: DISCONTINUED | OUTPATIENT
Start: 2021-04-22 | End: 2021-04-21 | Stop reason: HOSPADM

## 2021-04-21 RX ORDER — DIPHENHYDRAMINE HYDROCHLORIDE 50 MG/ML
25 INJECTION INTRAMUSCULAR; INTRAVENOUS EVERY 6 HOURS PRN
Status: DISCONTINUED | OUTPATIENT
Start: 2021-04-21 | End: 2021-04-21 | Stop reason: HOSPADM

## 2021-04-21 RX ORDER — TOPIRAMATE 50 MG/1
50 TABLET, FILM COATED ORAL EVERY 12 HOURS SCHEDULED
Qty: 60 TABLET | Refills: 0 | Status: SHIPPED | OUTPATIENT
Start: 2021-04-28 | End: 2021-04-27 | Stop reason: SINTOL

## 2021-04-21 RX ORDER — METHYLPREDNISOLONE 4 MG/1
4 TABLET ORAL
Status: DISCONTINUED | OUTPATIENT
Start: 2021-04-26 | End: 2021-04-21 | Stop reason: HOSPADM

## 2021-04-21 RX ORDER — TOPIRAMATE 25 MG/1
25 TABLET ORAL EVERY 12 HOURS SCHEDULED
Qty: 14 TABLET | Refills: 0 | Status: SHIPPED | OUTPATIENT
Start: 2021-04-21 | End: 2021-04-27 | Stop reason: SINTOL

## 2021-04-21 RX ADMIN — BUDESONIDE AND FORMOTEROL FUMARATE DIHYDRATE 2 PUFF: 160; 4.5 AEROSOL RESPIRATORY (INHALATION) at 08:33

## 2021-04-21 RX ADMIN — ASPIRIN 81 MG: 81 TABLET, COATED ORAL at 08:11

## 2021-04-21 RX ADMIN — INSULIN LISPRO 2 UNITS: 100 INJECTION, SOLUTION INTRAVENOUS; SUBCUTANEOUS at 08:12

## 2021-04-21 RX ADMIN — INSULIN LISPRO 2 UNITS: 100 INJECTION, SOLUTION INTRAVENOUS; SUBCUTANEOUS at 12:11

## 2021-04-21 RX ADMIN — SODIUM CHLORIDE, PRESERVATIVE FREE 10 ML: 5 INJECTION INTRAVENOUS at 08:13

## 2021-04-22 LAB
ACE CSF-CCNC: <1.5 U/L (ref 0–3.1)
CYTO UR: NORMAL
LAB AP CASE REPORT: NORMAL
PATH REPORT.FINAL DX SPEC: NORMAL
PATH REPORT.GROSS SPEC: NORMAL

## 2021-04-23 ENCOUNTER — TELEPHONE (OUTPATIENT)
Dept: NEUROLOGY | Facility: CLINIC | Age: 56
End: 2021-04-23

## 2021-04-23 LAB
ALB CSF/SERPL: 6 {RATIO} (ref 0–8)
ALB CSF/SERPL: 6 {RATIO} (ref 0–8)
ALBUMIN CSF-MCNC: 25 MG/DL (ref 11–48)
ALBUMIN CSF-MCNC: 26 MG/DL (ref 11–48)
ALBUMIN CSF-MCNC: 29 MG/DL (ref 11–48)
ALBUMIN SERPL-MCNC: 4.5 G/DL (ref 3.8–4.9)
ALBUMIN SERPL-MCNC: 4.6 G/DL (ref 3.8–4.9)
ALBUMIN SERPL-MCNC: 4.6 G/DL (ref 3.8–4.9)
B BURGDOR IGG PATRN CSF IB-IMP: NEGATIVE
B BURGDOR IGM PATRN CSF IB-IMP: NEGATIVE
B BURGDOR18KD IGG CSF QL IB: ABNORMAL
B BURGDOR23KD IGG CSF QL IB: PRESENT
B BURGDOR23KD IGM CSF QL IB: ABNORMAL
B BURGDOR28KD IGG CSF QL IB: PRESENT
B BURGDOR30KD IGG CSF QL IB: ABNORMAL
B BURGDOR39KD IGG CSF QL IB: ABNORMAL
B BURGDOR39KD IGM CSF QL IB: ABNORMAL
B BURGDOR41KD IGG CSF QL IB: PRESENT
B BURGDOR41KD IGM CSF QL IB: ABNORMAL
B BURGDOR45KD IGG CSF QL IB: ABNORMAL
B BURGDOR58KD IGG CSF QL IB: ABNORMAL
B BURGDOR66KD IGG CSF QL IB: ABNORMAL
B BURGDOR93KD IGG CSF QL IB: PRESENT
BACTERIA SPEC AEROBE CULT: NORMAL
GRAM STN SPEC: NORMAL
IGG CSF-MCNC: 1.9 MG/DL (ref 0–8.6)
IGG CSF-MCNC: 2 MG/DL (ref 0–8.6)
IGG CSF-MCNC: 2 MG/DL (ref 0–8.6)
IGG SERPL-MCNC: 792 MG/DL (ref 603–1613)
IGG SERPL-MCNC: 796 MG/DL (ref 603–1613)
IGG SERPL-MCNC: 805 MG/DL (ref 603–1613)
IGG SYNTH RATE SER+CSF CALC-MRATE: -3 MG/DAY
IGG SYNTH RATE SER+CSF CALC-MRATE: -3.5 MG/DAY
IGG SYNTH RATE SER+CSF CALC-MRATE: -4.1 MG/DAY
IGG/ALB CLEAR SER+CSF-RTO: 0.4 (ref 0–0.7)
IGG/ALB CLEAR SER+CSF-RTO: 0.4 (ref 0–0.7)
IGG/ALB CSF: 0.07 {RATIO} (ref 0–0.25)
IGG/ALB CSF: 0.07 {RATIO} (ref 0–0.25)
MBP CSF-MCNC: 4.1 NG/ML (ref 0–4.7)
OLIGOCLONAL BANDS.IT SER+CSF QL: NORMAL

## 2021-04-23 NOTE — TELEPHONE ENCOUNTER
There is nothing to do for tick bite (was there a bite?) at this time from my standpoint. He should check with his PCP. He can monitor for a rash. Tick bite would not be causing or worsening any of these symptoms. I do not think his vision/balance symptoms are urgent at this point as he has had had extensive work up. He did have mildly elevated ICP but it was lowered by LP and was not extremely high. We need to give the topamax some time to work. We can check with Dr Phelps on Monday and see if he thinks the patient needs to be moved up.

## 2021-04-23 NOTE — TELEPHONE ENCOUNTER
Provider: DR.THORTON  Caller: DILLON  Relationship to Patient: SISTER  Phone Number: [phone] (PTS NUMBER)    Reason for Call: PT WAS DISCHARGED ON 4-21-21 , SINCE BEING DISCHARGED HE FOUND A TICK ON HIM (KEPT IN A BAG) , WORSENING HEADACHE, VISION BLURRINESS AS WELL AS DOUBLE VISION, UNSTABLE ON HIS FEET. PT'S SISTER IS UNSURE WHAT TO DO AT THIS POINT. SHE STATES THAT HE DOESN'T BELIEVE HE CAN WAIT TO BE SEEN BY  ON MAY 5,2021    When was the patient last seen: SEEN IN HOSPITAL

## 2021-04-23 NOTE — TELEPHONE ENCOUNTER
[facility], if needed. From a h/a and vision stand point it was take time for topamax will start working, plus his dose will increase to 50 mg BID once he has been on 25 mg BID x1 week. There will be a lag in his headache/visual symptoms improving but topamax will help both.

## 2021-04-23 NOTE — TELEPHONE ENCOUNTER
"Spoke with patient's sister, Lacy.  She states that they did find a tick bite, the tick was imbedded in his skin, and there was a small rash.  Advised that they reach out to his PCP to evaluate.  She states he does not have a PCP because he \"just moved out here\".    Once again, she states that his vision and balance are worsening and the patient is complaining of a headache.  Discussed recommendations per GLADYS Tejada. Advised that he should seek immediate medical attention if patient's condition is truly worsening.    "

## 2021-04-24 LAB — T PALLIDUM AB CSF QL IF: NON REACTIVE

## 2021-04-27 ENCOUNTER — OFFICE VISIT (OUTPATIENT)
Dept: INTERNAL MEDICINE | Facility: CLINIC | Age: 56
End: 2021-04-27

## 2021-04-27 VITALS
BODY MASS INDEX: 35.16 KG/M2 | SYSTOLIC BLOOD PRESSURE: 138 MMHG | HEIGHT: 72 IN | TEMPERATURE: 97.5 F | RESPIRATION RATE: 16 BRPM | HEART RATE: 110 BPM | WEIGHT: 259.6 LBS | DIASTOLIC BLOOD PRESSURE: 82 MMHG | OXYGEN SATURATION: 98 %

## 2021-04-27 DIAGNOSIS — Z53.20 PATIENT LEFT BEFORE TREATMENT COMPLETED: Primary | ICD-10-CM

## 2021-04-27 NOTE — PROGRESS NOTES
"Subjective    Brandon Tavera is a 56 y.o. male presenting today for   Chief Complaint   Patient presents with   • Hospital Follow Up Visit   • Establish Care       History of Present Illness     Brandon Tavera presents today as a new patient to me to establish care.     Current/chronic health conditions include:    Patient Active Problem List   Diagnosis   • Type 2 diabetes mellitus with hyperglycemia, without long-term current use of insulin (CMS/[facility])   • Blurred vision, bilateral   • Blurred vision   • LIUDMILA (obstructive sleep apnea)   • Diplopia   • Double vision   • Cervical pain         Current Outpatient Medications:   •  aspirin (aspirin) 81 MG EC tablet, Take 1 tablet by mouth Daily for 30 days., Disp: 30 tablet, Rfl: 0  •  budesonide-formoterol (SYMBICORT) 160-4.5 MCG/ACT inhaler, Inhale 2 puffs 2 (Two) Times a Day., Disp: , Rfl:   •  Dulaglutide (TRULICITY SC), Inject 1.3 Int'l Units/mL under the skin into the appropriate area as directed 1 (One) Time Per Week. Due every wednesdays, Disp: , Rfl:   •  Insulin Glargine (LANTUS SOLOSTAR) 100 UNIT/ML injection pen, Inject 15 Units under the skin into the appropriate area as directed Daily for 30 days., Disp: 15 mL, Rfl: 0  •  Insulin Pen Needle (B-D UF III MINI PEN NEEDLES) 31G X 5 MM misc, use with insulin injections once daily., Disp: 100 each, Rfl: 0  •  SITagliptin (JANUVIA) 100 MG tablet, Take 100 mg by mouth Every Night., Disp: , Rfl:   •  albuterol (ACCUNEB) 1.25 MG/3ML nebulizer solution, Take 1 ampule by nebulization Every 6 (Six) Hours As Needed for Wheezing., Disp: , Rfl:       DM - dx was around 8 yrs ago. His current regimen prescribed by his PCP in [place] is       Pt awoke on 04/05 w/ abrupt onset of double vision. This is progressive and now includes dizziness and weakness.     Pt sts \"I know I have lyme disease.\" Pt sts that during the course of his hospital stay, he was never examined. He sts he removed a tick from his LLE upon d/c from " "hospital.    This was the extent of the history that I was able to collect.     Objective    Vitals:    04/27/21 1302   BP: 138/82   BP Location: Left arm   Patient Position: Sitting   Cuff Size: Large Adult   Pulse: 110   Resp: 16   Temp: 97.5 °F (36.4 °C)   TempSrc: Temporal   SpO2: 98%   Weight: 118 kg (259 lb 9.6 oz)   Height: 182.9 cm (72.01\")     Body mass index is 35.2 kg/m².  Nursing notes and vitals reviewed.    Physical Exam  Constitutional:       Appearance: He is obese.   Neurological:      Mental Status: He is alert.   Psychiatric:         Mood and Affect: Affect is angry.         Behavior: Behavior is uncooperative.           Assessment and Plan    Diagnoses and all orders for this visit:    1. Patient left before treatment completed (Primary)      Pt presented today to establish care. From the outset of interview, Mr. Tavera refused to answer most of my questions. He became increasingly hostile in verbal tone. As I attempted to proceed w/ interview, he abruptly stated that \"this was not going to work for him.\" He then abruptly exited the exam room stating that he would find a different provider.    , ROCHELLE Page was notified of patient's departure and statement that he would be establishing care elsewhere.  "

## 2021-04-28 ENCOUNTER — OFFICE VISIT (OUTPATIENT)
Dept: FAMILY MEDICINE CLINIC | Facility: CLINIC | Age: 56
End: 2021-04-28

## 2021-04-28 VITALS
TEMPERATURE: 97.1 F | HEIGHT: 72 IN | OXYGEN SATURATION: 97 % | HEART RATE: 108 BPM | BODY MASS INDEX: 35.16 KG/M2 | WEIGHT: 259.6 LBS | SYSTOLIC BLOOD PRESSURE: 130 MMHG | DIASTOLIC BLOOD PRESSURE: 80 MMHG

## 2021-04-28 DIAGNOSIS — Z00.00 ENCOUNTER FOR WELL ADULT EXAM WITHOUT ABNORMAL FINDINGS: ICD-10-CM

## 2021-04-28 DIAGNOSIS — Z79.899 HIGH RISK MEDICATION USE: ICD-10-CM
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
## 2021-04-28 DIAGNOSIS — Z79.4 TYPE 2 DIABETES MELLITUS WITHOUT COMPLICATION, WITH LONG-TERM CURRENT USE OF INSULIN (HCC): ICD-10-CM

## 2021-04-28 DIAGNOSIS — E11.9 TYPE 2 DIABETES MELLITUS WITHOUT COMPLICATION, WITH LONG-TERM CURRENT USE OF INSULIN (HCC): ICD-10-CM

## 2021-04-28 PROBLEM — J30.2 SEASONAL ALLERGIES: Status: ACTIVE | Noted: 2021-04-28

## 2021-04-28 PROBLEM — J45.40 MODERATE PERSISTENT ASTHMA WITHOUT COMPLICATION: Status: ACTIVE | Noted: 2021-04-28
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
## 2021-04-28 PROCEDURE — 99204 OFFICE O/P NEW MOD 45 MIN: CPT | Performed by: FAMILY MEDICINE

## 2021-04-28 RX ORDER — DULAGLUTIDE 1.5 MG/.5ML
0.75 INJECTION, SOLUTION SUBCUTANEOUS WEEKLY
Qty: 5 PEN | Refills: 5
Start: 2021-04-28

## 2021-04-28 RX ORDER — DOXYCYCLINE HYCLATE 100 MG/1
100 TABLET, DELAYED RELEASE ORAL 2 TIMES DAILY
Qty: 28 TABLET | Refills: 0 | Status: SHIPPED | OUTPATIENT
Start: 2021-04-28

## 2021-04-28 NOTE — PATIENT INSTRUCTIONS
Diabetes Mellitus and Nutrition, Adult  When you have diabetes, or diabetes mellitus, it is very important to have healthy eating habits because your blood sugar (glucose) levels are greatly affected by what you eat and drink. Eating healthy foods in the right amounts, at about the same times every day, can help you:  · Control your blood glucose.  · Lower your risk of heart disease.  · Improve your blood pressure.  · Reach or maintain a healthy weight.  What can affect my meal plan?  Every person with diabetes is different, and each person has different needs for a meal plan. Your health care provider may recommend that you work with a dietitian to make a meal plan that is best for you. Your meal plan may vary depending on factors such as:  · The calories you need.  · The medicines you take.  · Your weight.  · Your blood glucose, blood pressure, and cholesterol levels.  · Your activity level.  · Other health conditions you have, such as heart or kidney disease.  How do carbohydrates affect me?  Carbohydrates, also called carbs, affect your blood glucose level more than any other type of food. Eating carbs naturally raises the amount of glucose in your blood. Carb counting is a method for keeping track of how many carbs you eat. Counting carbs is important to keep your blood glucose at a healthy level, especially if you use insulin or take certain oral diabetes medicines.  It is important to know how many carbs you can safely have in each meal. This is different for every person. Your dietitian can help you calculate how many carbs you should have at each meal and for each snack.  How does alcohol affect me?  Alcohol can cause a sudden decrease in blood glucose (hypoglycemia), especially if you use insulin or take certain oral diabetes medicines. Hypoglycemia can be a life-threatening condition. Symptoms of hypoglycemia, such as sleepiness, dizziness, and confusion, are similar to symptoms of having too much  "alcohol.  · Do not drink alcohol if:  ? Your health care provider tells you not to drink.  ? You are pregnant, may be pregnant, or are planning to become pregnant.  · If you drink alcohol:  ? Do not drink on an empty stomach.  ? Limit how much you use to:  § 0-1 drink a day for women.  § 0-2 drinks a day for men.  ? Be aware of how much alcohol is in your drink. In the [place] one drink equals one 12 oz bottle of beer (355 mL), one 5 oz glass of wine (148 mL), or one 1½ oz glass of hard liquor (44 mL).  ? Keep yourself hydrated with water, diet soda, or unsweetened iced tea.  § Keep in mind that regular soda, juice, and other mixers may contain a lot of sugar and must be counted as carbs.  What are tips for following this plan?    Reading food labels  · Start by checking the serving size on the \"Nutrition Facts\" label of packaged foods and drinks. The amount of calories, carbs, fats, and other nutrients listed on the label is based on one serving of the item. Many items contain more than one serving per package.  · Check the total grams (g) of carbs in one serving. You can calculate the number of servings of carbs in one serving by dividing the total carbs by 15. For example, if a food has 30 g of total carbs per serving, it would be equal to 2 servings of carbs.  · Check the number of grams (g) of saturated fats and trans fats in one serving. Choose foods that have a low amount or none of these fats.  · Check the number of milligrams (mg) of salt (sodium) in one serving. Most people should limit total sodium intake to less than 2,300 mg per day.  · Always check the nutrition information of foods labeled as \"low-fat\" or \"nonfat.\" These foods may be higher in added sugar or refined carbs and should be avoided.  · Talk to your dietitian to identify your daily goals for nutrients listed on the label.  Shopping  · Avoid buying canned, pre-made, or processed foods. These foods tend to be high in fat, sodium, and added " sugar.  · Shop around the outside edge of the grocery store. This is where you will most often find fresh fruits and vegetables, bulk grains, fresh meats, and fresh dairy.  Cooking  · Use low-heat cooking methods, such as baking, instead of high-heat cooking methods like deep frying.  · Cook using healthy oils, such as olive, canola, or sunflower oil.  · Avoid cooking with butter, cream, or high-fat meats.  Meal planning  · Eat meals and snacks regularly, preferably at the same times every day. Avoid going long periods of time without eating.  · Eat foods that are high in fiber, such as fresh fruits, vegetables, beans, and whole grains. Talk with your dietitian about how many servings of carbs you can eat at each meal.  · Eat 4-6 oz (112-168 g) of lean protein each day, such as lean meat, chicken, fish, eggs, or tofu. One ounce (oz) of lean protein is equal to:  ? 1 oz (28 g) of meat, chicken, or fish.  ? 1 egg.  ? ¼ cup (62 g) of tofu.  · Eat some foods each day that contain healthy fats, such as avocado, nuts, seeds, and fish.  What foods should I eat?  Fruits  Berries. Apples. Oranges. Peaches. Apricots. Plums. Grapes. Harsh. Papaya. Pomegranate. Kiwi. Cherries.  Vegetables  Lettuce. Spinach. Leafy greens, including kale, chard, otnia greens, and mustard greens. Beets. Cauliflower. Cabbage. Broccoli. Carrots. Green beans. Tomatoes. Peppers. Onions. Cucumbers. [place] sprouts.  Grains  Whole grains, such as whole-wheat or whole-grain bread, crackers, tortillas, cereal, and pasta. Unsweetened oatmeal. Quinoa. Brown or wild rice.  Meats and other proteins  Seafood. Poultry without skin. Lean cuts of poultry and beef. Tofu. Nuts. Seeds.  Dairy  Low-fat or fat-free dairy products such as milk, yogurt, and cheese.  The items listed above may not be a complete list of foods and beverages you can eat. Contact a dietitian for more information.  What foods should I avoid?  Fruits  Fruits canned with  syrup.  Vegetables  Canned vegetables. Frozen vegetables with butter or cream sauce.  Grains  Refined white flour and flour products such as bread, pasta, snack foods, and cereals. Avoid all processed foods.  Meats and other proteins  Fatty cuts of meat. Poultry with skin. Breaded or fried meats. Processed meat. Avoid saturated fats.  Dairy  Full-fat yogurt, cheese, or milk.  Beverages  Sweetened drinks, such as soda or iced tea.  The items listed above may not be a complete list of foods and beverages you should avoid. Contact a dietitian for more information.  Questions to ask a health care provider  · Do I need to meet with a diabetes educator?  · Do I need to meet with a dietitian?  · What number can I call if I have questions?  · When are the best times to check my blood glucose?  Where to find more information:  · American Diabetes Association: diabetes.org  · Academy of Nutrition and Dietetics: www.eatright.org  · National [place] of Diabetes and Digestive and Kidney Diseases: www.niddk.nih.gov  · Association of Diabetes Care and Education Specialists: www.diabeteseducator.org  Summary  · It is important to have healthy eating habits because your blood sugar (glucose) levels are greatly affected by what you eat and drink.  · A healthy meal plan will help you control your blood glucose and maintain a healthy lifestyle.  · Your health care provider may recommend that you work with a dietitian to make a meal plan that is best for you.  · Keep in mind that carbohydrates (carbs) and alcohol have immediate effects on your blood glucose levels. It is important to count carbs and to use alcohol carefully.  This information is not intended to replace advice given to you by your health care provider. Make sure you discuss any questions you have with your health care provider.  Document Revised: 11/24/2020 Document Reviewed: 11/24/2020  Elsevier Patient Education © 2021 [employer]

## 2021-04-28 NOTE — PROGRESS NOTES
Chief Complaint  Establish Care and Tick Removal (3-4 weekas )    Subjective          Brandon Tavera presents to [facility] for     History of Present Illness    Pt was seen at another office yesterday.    He found a tic on his leg a few weeks ago.  He states he pulled the tick off his leg about 3 days ago.  He thinks he has Lyme disease.  He states he was serving [employer] with a friend who is a  and woke up on April 5, 2021 with double vision.  He continued to have double vision and also developed a subjective fever and went to the [facility]. He was admitted to [facility] for 3 days. He had an MRI of his head. He saw an Ophthalmologist, Dr. Fisher who he states found swelling in the back of his eyes. He states he developed headaches and joint pain in his neck, shoulders, elbows, wrists, and midback. He was diagnosed with Idiopathic Intracranial Hypertension and was started on Topamax.  He was also given Topamax.  He is following with Ophthalmology and Neurology.  He states he was only tested for Lyme disease A, and he would like to be tested for the other three tests of the four types of Lyme disease tests.  He states he wants to be treated for possible lyme disease.  He states he had a rash around the tic bite site.    Patient has diabetes.  He takes Lantus 15 units daily, and Trulicity 1.5 weekly, and Januvia 100 mg daily.  He states his last hemoglobin A1c was around 7.8 or 7.9.    He states he has asthma that is controlled with Symbicort, Combivent, and albuterol nebulizer solution.    He has seasonal allergies and takes Claritin.    Health Maintenance Due   Topic Date Due   • URINE MICROALBUMIN  Never done   • COVID-19 Vaccine (1) Never done        reports that he has quit smoking. He has never used smokeless tobacco. He reports current alcohol use. He reports that he does not use drugs.    PHQ-9 Depression Screening  Little interest or pleasure in doing  things? 0   Feeling down, depressed, or hopeless? 0   Trouble falling or staying asleep, or sleeping too much?     Feeling tired or having little energy?     Poor appetite or overeating?     Feeling bad about yourself - or that you are a failure or have let yourself or your family down?     Trouble concentrating on things, such as reading the newspaper or watching television?     Moving or speaking so slowly that other people could have noticed? Or the opposite - being so fidgety or restless that you have been moving around a lot more than usual?     Thoughts that you would be better off dead, or of hurting yourself in some way?     PHQ-9 Total Score 0   If you checked off any problems, how difficult have these problems made it for you to do your work, take care of things at home, or get along with other people?       Lab Results   Component Value Date    WBC 7.33 04/20/2021    HGB 14.8 04/20/2021    HCT 42.5 04/20/2021    MCV 95.9 04/20/2021     (L) 04/20/2021     Lab Results   Component Value Date    GLUCOSE 224 (H) 04/20/2021    BUN 14 04/20/2021    CREATININE 0.71 (L) 04/20/2021    EGFRIFNONA 116 04/20/2021    BCR 19.7 04/20/2021    K 4.0 04/20/2021    CO2 24.5 04/20/2021    CALCIUM 9.0 04/20/2021    ALBUMIN 4.5 04/20/2021    ALBUMIN 4.6 04/20/2021    ALBUMIN 4.6 04/20/2021    AST 31 04/19/2021    ALT 52 (H) 04/19/2021     Lab Results   Component Value Date    TSH 1.720 04/14/2021     Lab Results   Component Value Date    HGBA1C 7.90 (H) 04/14/2021     Brief Urine Lab Results  (Last result in the past 365 days)      Color   Clarity   Blood   Leuk Est   Nitrite   Protein   CREAT   Urine HCG        04/13/21 1214 Yellow Clear Negative Negative Negative Negative               BP Readings from Last 12 Encounters:   04/28/21 130/80   04/27/21 138/82   04/21/21 147/84   04/19/21 123/74   04/15/21 107/75   04/13/21 118/75       Wt Readings from Last 12 Encounters:   04/28/21 118 kg (259 lb 9.6 oz)   04/27/21 118  "kg (259 lb 9.6 oz)   04/19/21 111 kg (245 lb 6.4 oz)   04/19/21 107 kg (235 lb)   04/13/21 107 kg (235 lb)       Objective   Vital Signs:   /80 (BP Location: Right arm, Patient Position: Sitting, Cuff Size: Adult)   Pulse 108   Temp 97.1 °F (36.2 °C) (Temporal)   Ht 182.9 cm (72\")   Wt 118 kg (259 lb 9.6 oz)   SpO2 97%   BMI 35.21 kg/m²     Physical Exam  Vitals and nursing note reviewed.   Constitutional:       General: He is not in acute distress.     Appearance: Normal appearance. He is well-developed. He is not diaphoretic.   HENT:      Head: Normocephalic and atraumatic.      Right Ear: External ear normal.      Left Ear: External ear normal.      Nose: Nose normal.      Mouth/Throat:      Pharynx: No oropharyngeal exudate.   Eyes:      General: Lids are normal. No scleral icterus.        Right eye: No discharge.         Left eye: No discharge.   Neck:      Thyroid: No thyromegaly.      Trachea: Trachea normal. No tracheal deviation.   Cardiovascular:      Rate and Rhythm: Normal rate and regular rhythm.      Heart sounds: Normal heart sounds. No murmur heard.   No friction rub. No gallop.    Pulmonary:      Effort: Pulmonary effort is normal. No tachypnea, bradypnea or respiratory distress.      Breath sounds: Normal breath sounds. No stridor. No decreased breath sounds, wheezing or rales.   Chest:      Chest wall: No tenderness.   Musculoskeletal:      Cervical back: Full passive range of motion without pain, normal range of motion and neck supple. No edema.   Lymphadenopathy:      Head:      Right side of head: No submental, submandibular, tonsillar, preauricular, posterior auricular or occipital adenopathy.      Left side of head: No submental, submandibular, tonsillar, preauricular, posterior auricular or occipital adenopathy.      Cervical: No cervical adenopathy.      Right cervical: No superficial, deep or posterior cervical adenopathy.     Left cervical: No superficial, deep or posterior " cervical adenopathy.   Skin:     General: Skin is warm and dry.      Capillary Refill: Capillary refill takes less than 2 seconds.      Coloration: Skin is not pale.      Findings: No erythema or rash.      Nails: There is no clubbing.   Neurological:      Mental Status: He is alert and oriented to person, place, and time. He is not disoriented.      Deep Tendon Reflexes: Reflexes are normal and symmetric.   Psychiatric:         Behavior: Behavior normal.        Result Review :          Foreign Body Removal    Date/Time: 4/28/2021 1:39 PM  Performed by: Andre Forrest Jr., DO  Authorized by: Andre Forrest Jr., DO   Body area: skin    Sedation:  Patient sedated: no    Patient restrained: no  Tendon involvement: none  Complexity: simple  Post-procedure assessment: foreign body removed  Patient tolerance: patient tolerated the procedure well with no immediate complications  Comments: Head of tic removed by removing / deroofing superficial skin layer with 18 gauge needle and scraping away any remaining tic pieces.          Assessment and Plan    Problem List Items Addressed This Visit     Tick bite - Primary    Overview     4/28/2021: He found a tic on his leg a few weeks ago.  He states he pulled the tick off his leg about 3 days ago.  He thinks he has Lyme disease.  He states he was serving [employer] with a friend who is a  and woke up on April 5, 2021 with double vision.  He continued to have double vision and also developed a subjective fever and went to the [facility]. He was admitted to [facility] for 3 days. He had an MRI of his head. He saw an Ophthalmologist, Dr. Fisher who he states found swelling in the back of his eyes. He states he developed headaches and joint pain in his neck, shoulders, elbows, wrists, and midback. He was diagnosed with Idiopathic Intracranial Hypertension and was started on Topamax.  He was also given Topamax.  He is following with Ophthalmology and Neurology.   He states he was only tested for Lyme disease A, and he would like to be tested for the other three tests of the four types of Lyme disease tests.  He states he wants to be treated for possible lyme disease.  He states he had a rash around the tic bite site. Tic pieces removed in office with 18 gauge needle scraping. Doxycycline 100 mg BID X 14 days.           Relevant Medications    doxycycline (DORYX) 100 MG enteric coated tablet    Other Relevant Orders    Foreign Body Removal      Other Visit Diagnoses     Type 2 diabetes mellitus without complication, with long-term current use of insulin (CMS/[facility])        Relevant Medications    Dulaglutide (Trulicity) 1.5 MG/0.5ML solution pen-injector    Other Relevant Orders    Hemoglobin A1c    High risk medication use        Relevant Orders    CBC & Differential    Comprehensive Metabolic Panel    TSH    Lipid Panel With / Chol / HDL Ratio    UA / M With / Rflx Culture([facility] ONLY) - Urine, Clean Catch    Encounter for well adult exam without abnormal findings        Relevant Orders    CBC & Differential    Comprehensive Metabolic Panel    TSH    Lipid Panel With / Chol / HDL Ratio    UA / M With / Rflx Culture([facility] ONLY) - Urine, Clean Catch          Follow Up   Return in about 6 months (around 10/28/2021) for Annual physical with cbc cmp flp tsh ua A1c prior.  Patient was given instructions and counseling regarding his condition or for health maintenance advice. Please see specific information pulled into the AVS if appropriate.

## 2021-04-29 ENCOUNTER — TELEPHONE (OUTPATIENT)
Dept: FAMILY MEDICINE CLINIC | Facility: CLINIC | Age: 56
End: 2021-04-29

## 2021-04-29 NOTE — TELEPHONE ENCOUNTER
LVM for patient's sister that the medication had to be changed to capsules and that it was verbally changed to that when I spoke to the pharmacy and the medication should be no charge and should be ready to  shortly.      HUB- if they call patient please relay this message to them and document the conversation.  Thank you!

## 2021-04-29 NOTE — TELEPHONE ENCOUNTER
PATIENT'S SISTER IS CALLING REGARDING THE PA FOR THE PRESCRIPTION, DOXYCYCLINE AND WANTED AN UPDATE. PLEASE ADVISE.    CALL BACK: [phone]

## 2021-04-29 NOTE — TELEPHONE ENCOUNTER
PATIENT'S SISTER IS CALLING TO CHECK STATUS. SHE STATES THAT IT IS URGENT AND WOULD LIKE IT TO BE TAKEN CARE OF TODAY.

## 2021-04-30 ENCOUNTER — TELEPHONE (OUTPATIENT)
Dept: NEUROSURGERY | Facility: CLINIC | Age: 56
End: 2021-04-30

## 2021-04-30 NOTE — TELEPHONE ENCOUNTER
----- Message from Diandra Navarro MA sent at 4/27/2021  8:49 AM EDT -----  Regarding: FW: f/up  Please schedule patient in one month. Per Scarlett soler to send to  pool   ----- Message -----  From: Dennise Ordonez APRN  Sent: 4/21/2021  12:52 PM EDT  To: Diandra Navarro MA  Subject: RE: f/up                                         Thank you!  ----- Message -----  From: Diandra Navarro MA  Sent: 4/21/2021  12:46 PM EDT  To: GLADYS Leon  Subject: RE: f/up                                         I called patient to ask where he would like to go for physical therapy. [facility] [address] Patient is aware. I will schedule him once the que opens up.   ----- Message -----  From: Dennise Ordonez APRN  Sent: 4/21/2021  12:33 PM EDT  To: Diandra Navarro MA  Subject: f/up                                             Please order outpatient PT for him r/t his cervical stenosis and his right arm pain, and have him f/up in the office in one month.  Thank you!

## 2021-05-04 ENCOUNTER — TELEPHONE (OUTPATIENT)
Dept: NEUROSURGERY | Facility: CLINIC | Age: 56
End: 2021-05-04

## 2021-05-04 NOTE — TELEPHONE ENCOUNTER
HOSPITAL FOLLOW UP    Caller: RECEIVED REFERRAL FROM EMIL RIVAS MD 04/29/21. INCLUDES DEMO, PT HX, LABS 10/28/20, XR C-SPINE 4 VW 01/04/21, XR T-SPINE 2 VW 01/04/21. INDEXED IN CHART. (LABS & RADIOLOGY REPORTS INDEXED AS EXT MED RECS_EMIL RIVAS MD_04/29/21 - UNABLE TO SEPARATE DUE TO FORMAT OF RECORDS).    New or established patient?  [x] New  [] Established    Admit date: 4/19/2021  Discharge date and time: 4/21/2021    Facility discharged from: [facility]    Specialty Only: Did you see a [employer] health provider?    [x] Yes  [] No  If so, who? DEV ST, APRN

## 2021-05-05 ENCOUNTER — OFFICE VISIT (OUTPATIENT)
Dept: NEUROLOGY | Facility: CLINIC | Age: 56
End: 2021-05-05

## 2021-05-05 VITALS
WEIGHT: 259 LBS | HEART RATE: 100 BPM | DIASTOLIC BLOOD PRESSURE: 72 MMHG | OXYGEN SATURATION: 98 % | HEIGHT: 72 IN | BODY MASS INDEX: 35.08 KG/M2 | SYSTOLIC BLOOD PRESSURE: 130 MMHG

## 2021-05-05 DIAGNOSIS — H51.23 INO (INTERNUCLEAR OPHTHALMOPLEGIA), BILATERAL: Primary | ICD-10-CM

## 2021-05-05 DIAGNOSIS — R83.5: ICD-10-CM

## 2021-05-05 DIAGNOSIS — G62.9 PERIPHERAL POLYNEUROPATHY: ICD-10-CM

## 2021-05-05 DIAGNOSIS — G43.001 MIGRAINE WITHOUT AURA AND WITH STATUS MIGRAINOSUS, NOT INTRACTABLE: ICD-10-CM

## 2021-05-05 PROCEDURE — 99214 OFFICE O/P EST MOD 30 MIN: CPT | Performed by: PSYCHIATRY & NEUROLOGY

## 2021-05-05 RX ORDER — RIBOFLAVIN (VITAMIN B2) 400 MG
400 TABLET ORAL DAILY
Qty: 90 TABLET | Refills: 2 | Status: SHIPPED | OUTPATIENT
Start: 2021-05-05

## 2021-05-05 NOTE — TELEPHONE ENCOUNTER
Called and LVM regarding upcoming hospital follow up after physical therapy on 5/13/21 with GLADYS Almonte and if that was not a good time, to give our office a call back.
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
## 2021-05-06 LAB
ACHR BIND AB SER-SCNC: <0.03 NMOL/L (ref 0–0.24)
ACHR BLOCK AB SER-ACNC: 24 % (ref 0–25)
ACHR MOD AB/ACHR TOTAL SFR SER: <12 % (ref 0–20)
MUSK AB SER-SCNC: <1 U/ML
REFLEX INFORMATION: NORMAL
STRIA MUS AB TITR SER IF: NEGATIVE {TITER}

## 2021-05-10 ENCOUNTER — HOSPITAL ENCOUNTER (OUTPATIENT)
Dept: INFUSION THERAPY | Facility: HOSPITAL | Age: 56
Discharge: HOME | End: 2021-05-10
Admitting: PSYCHIATRY & NEUROLOGY

## 2021-05-10 DIAGNOSIS — G62.9 PERIPHERAL POLYNEUROPATHY: ICD-10-CM

## 2021-05-10 PROCEDURE — 95886 MUSC TEST DONE W/N TEST COMP: CPT

## 2021-05-10 PROCEDURE — 95909 NRV CNDJ TST 5-6 STUDIES: CPT

## 2021-05-10 PROCEDURE — 95909 NRV CNDJ TST 5-6 STUDIES: CPT | Performed by: PSYCHIATRY & NEUROLOGY

## 2021-05-10 PROCEDURE — 95886 MUSC TEST DONE W/N TEST COMP: CPT | Performed by: PSYCHIATRY & NEUROLOGY

## 2021-05-10 NOTE — PROCEDURES
EMG and Nerve Conduction Studies    I.      Instrument used: Neuromax 1002  II.     Please see data sheets for tabular summary of NCS and details on methods, temperatures and lab standards.   III.    EMG muscles tested for upper extremity studies include the deltoid, biceps, triceps, pronator teres, extensor digitorum communis, first dorsal interosseous and abductor pollicis brevis.    IV.   EMG muscles tested for lower extremity studies include the vastus lateralis, tibialis anterior, peroneus longus, medial gastrocnemius and extensor digitorum brevis.    V.    Additional muscles tested as needed.  Paraspinal muscles tested as needed.   VI.   Please see data sheets for tabular summary of EMG findings.   VII. The complete report includes the data sheets.      Indication: Peripheral neuropathy  History: 56-year-old white male with 20-year history of numbness tingling pins-and-needles in the feet who was diagnosed with diabetes around 5 years ago also had some elevated thyroid history which apparently normalized who presents with continued symptoms but additional symptoms of balance problems as well as pain radiating into the shoulders down the arms and some weakness in the arms.  Recent MRI of the cervical spine demonstrates moderate spinal stenosis at C5/6.  CSF showed glucose elevated with normal protein of 39.  IgG synthesis rate was normal, oligoclonal bands negative and myelin basic protein normal.    The patient has been diagnosed with pseudotumor cerebri as well as Lyme disease and he is currently on tetracyclines.    The patient indicates that he will be  moving to [place] within a couple of weeks with plans on evaluation at the [facility].      Ht: 182.9 cm  Wt: 117 kg; BMI 35.13  HbA1C:   Lab Results   Component Value Date    HGBA1C 7.90 (H) 04/14/2021     TSH:   Lab Results   Component Value Date    TSH 1.720 04/14/2021       Technical summary:  Nerve conduction studies were obtained in the right  leg with 1 comparison on the left.  Skin temperature was a bit cold initially and so the feet were warmed prior to study.  The temperature dipped below 32 °C but temperature correction was not needed since the distal latencies were normal.  Needle examination was obtained on selected muscles in both legs.    Results:  1.  Normal right sural sensory study.  2.  Normal right superficial peroneal sensory study.  Normal left superficial peroneal sensory latency with low amplitude of 3.3 µV.  3.  Slow right peroneal motor velocity at 35.9 m/s below the knee with normal velocity in the short segment across the fibular head.  Normal distal latency and amplitudes.  4.  Slow right tibial motor velocity at 37.1 m/s with normal distal latency and amplitudes.  5.  Needle examination of selected muscles in both legs showed normal insertional activities throughout.  There were normal motor units and recruitment patterns throughout.    Impression:  Abnormal study showing mild to moderate peripheral neuropathy.  No evidence of a lumbosacral radiculopathy on either side by the study.  Study results were discussed with the patient.    Mehdi Montoya M.D.          Dictated utilizing Dragon dictation.

## 2021-05-11 NOTE — PROGRESS NOTES
Subjective   Patient ID: Brandon Tavera is a 56 y.o. male is here today for a hospital follow-up to recent hospital consultation for R sided neck and R arm pain. He also c/o issues with diblopia, headache, and idiopathic neuropathy. He was seen initially by Neurology. Neurosurgery saw him for the neck and R arm pain. He has MRI imaging of the cervical spine that did not show anything dangerous. He was referred for outpatient PT and one month f/u.     .     History of Present Illness     Mr. Tavera is here for f/u from recent hospital consultation for neck pain and L arm pain. He is a migraine sufferer. He had been c/o visual difficulty described as blurred vision, mild light sensitivity and headache. This was evaluated by Neurology at the time of their hospital consultation. Neurology consult note mentioned that the patient was evaluated by Dr. Radha Fisher, ophthalmology.  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  Dr. Fisher's exam indicated no finding of any optic disc edema, optic nerve abnormality or papilledema.  Neurology conducted an extensive work-up including CT venogram head, MRI orbits, MRI brain with and without contrast, CTA head neck as well as lumbar puncture with large volume tap with CSF testing. Neurosurgery evaluated the patient for cervical pain, R lower scapular pain and right arm pain. MRI of the cervical spine was performed during that hospitalization and the results will be discussed further below.  He reports pins and needles sensation in his right arm since 1995.  He states that at the time of his hospitalization the pins and needle sensation have now traveled to the left arm.  He also reports a history of migraines.  He had reported some double vision to neurology and states that this will get worse in the middle of a severe migraine flare.  Right now he states that  "his headaches are there.  Patient had been referred for outpatient physical therapy which she has not started yet.  He reports that his symptoms are little bit better.  The right arm pain is better however he is now experiencing intermittent L arm pain, numbness and tingling.  He also reports improvement in his vision since his hospitalization.  His migraines have also improved.      The following portions of the patient's history were reviewed and updated as appropriate: allergies, current medications, past family history, past medical history, past social history, past surgical history and problem list.    Review of Systems   Constitutional: Positive for activity change.   Respiratory: Negative for chest tightness and shortness of breath.    Cardiovascular: Negative for chest pain.   Gastrointestinal: Negative for nausea.   Genitourinary: Negative for difficulty urinating.   Musculoskeletal: Positive for back pain, myalgias and neck pain.   Neurological: Positive for weakness and numbness.        Tingling   Psychiatric/Behavioral: Positive for sleep disturbance.       Objective     Vitals:    05/13/21 0959   BP: 134/74   BP Location: Left arm   Patient Position: Sitting   Temp: 98.3 °F (36.8 °C)   TempSrc: Temporal   Weight: 117 kg (259 lb)   Height: 182.9 cm (72\")     Body mass index is 35.13 kg/m².      Physical Exam  Vitals reviewed.   Constitutional:       General: He is not in acute distress.     Appearance: He is well-developed. He is not ill-appearing, toxic-appearing or diaphoretic.   HENT:      Head: Normocephalic and atraumatic.      Right Ear: External ear normal.      Left Ear: External ear normal.      Nose: Nose normal.      Mouth/Throat:      Mouth: Mucous membranes are moist.      Pharynx: Oropharynx is clear.   Eyes:      General:         Right eye: No discharge.         Left eye: No discharge.      Extraocular Movements: Extraocular movements intact.      Conjunctiva/sclera: Conjunctivae normal. "      Pupils: Pupils are equal, round, and reactive to light.   Neck:      Trachea: No tracheal deviation.   Cardiovascular:      Rate and Rhythm: Normal rate.   Pulmonary:      Effort: Pulmonary effort is normal. No respiratory distress.   Abdominal:      General: There is no distension.      Palpations: Abdomen is soft.      Tenderness: There is no abdominal tenderness.   Musculoskeletal:         General: No tenderness. Normal range of motion.      Cervical back: Normal range of motion and neck supple. No rigidity or tenderness.   Skin:     General: Skin is warm and dry.      Findings: No erythema.   Neurological:      General: No focal deficit present.      Mental Status: He is alert and oriented to person, place, and time.      GCS: GCS eye subscore is 4. GCS verbal subscore is 5. GCS motor subscore is 6.      Cranial Nerves: No cranial nerve deficit.      Sensory: No sensory deficit.      Motor: No weakness or abnormal muscle tone.      Coordination: Coordination normal.      Gait: Gait normal.      Deep Tendon Reflexes: Reflexes are normal and symmetric. Reflexes normal.      Comments: No motor or sensory deficits. DTR's normal. Negative Loaiza's; negative clonus. SLR and Jose Luis's negative bilaterally.  Able to bear weight on heels and toes bilaterally.     Psychiatric:         Behavior: Behavior is cooperative.         Thought Content: Thought content normal.       Neurologic Exam     Mental Status   Oriented to person, place, and time.     Cranial Nerves     CN III, IV, VI   Pupils are equal, round, and reactive to light.      Assessment/Plan   Independent Review of Radiographic Studies:      I personally reviewed the images from the following studies.    EMG/NCS performed May 10, 2021 by Dr. Mehdi Montoya revealed mild to moderate peripheral neuropathy.  No evidence of right or left lumbosacral radiculopathy.    The patient underwent lumbar puncture CSF at 39, oligoclonal bands negative, IgG synthesis rate  normal, myelin basic protein normal.    MRI imaging of the cervical spine performed without contrast dated 4/20/2021 was reviewed today in the office with Dr. Jimenez.  The cervical MRI revealed degenerative disc osteophyte complexes with a broad-based paracentral disc protrusion at C4-5 on the left.  Mild canal stenosis and mild lateral recess and left neural foraminal stenosis.  Facet joint hypertrophy with moderate canal stenosis and moderate bilateral left greater than right neuroforaminal stenosis C5-6.  No significant canal or foraminal stenosis C6-7 or C7-T1.  Cervical cord signal is normal.      Medical Decision Making:      Mr. Tavera was seen for neurosurgical follow-up after recent consultation for complaints of neck pain and right greater than left arm pain.  He states that he had a multitude of symptoms when he presented to [facility] emergency room back in April.  He was subsequently admitted for complete work-up.  Patient states it was discovered a couple of days after his admission that he had a life tick adhered to his right posterior thigh.  He states that he may have contracted Lyme disease and states that the neurologist did a work-up for that and apparently he had some of the inflammatory markers for Lyme.  Overall the patient states that he is feeling better although he does have a history of chronic neck pain and right arm pain.  For some reason the left arm is been more bothersome lately than the right arm.  I reviewed the MRI images which show no severe canal compromise or cord compression..  There is no weakness on exam and no evidence of myelopathy or any red flags.    The patient would like to continue with conservative therapy to help with the neck pain.  I have recommended cervical epidural injections.  He will also do some physical therapy for electrical stimulation, strengthening, and dry needling or even mild cervical traction.  If he has any worsening symptoms, he will call  the office otherwise he will return in 6 weeks for re-evaluation. Mr. Tavera was encouraged to keep his follow-up appointments with his primary care doctor as well as neurology.     Diagnoses and all orders for this visit:    1. Cervical radiculopathy (Primary)  -     Epidural Block; Future  -     Ambulatory Referral to Physical Therapy Evaluate and treat (work on core strengthening); Electrotherapy; E-stim (dry needling); Strengthening (consider mild cervical traction)    2. Spinal stenosis in cervical region  -     Epidural Block; Future  -     Ambulatory Referral to Physical Therapy Evaluate and treat (work on core strengthening); Electrotherapy; E-stim (dry needling); Strengthening (consider mild cervical traction)    3. Cervico-occipital neuralgia of right side  -     Epidural Block; Future  -     Ambulatory Referral to Physical Therapy Evaluate and treat (work on core strengthening); Electrotherapy; E-stim (dry needling); Strengthening (consider mild cervical traction)      Return in about 6 weeks (around 6/24/2021) for Carol Echeverria.

## 2021-05-13 ENCOUNTER — OFFICE VISIT (OUTPATIENT)
Dept: NEUROSURGERY | Facility: CLINIC | Age: 56
End: 2021-05-13

## 2021-05-13 ENCOUNTER — TELEPHONE (OUTPATIENT)
Dept: NEUROSURGERY | Facility: CLINIC | Age: 56
End: 2021-05-13

## 2021-05-13 VITALS
SYSTOLIC BLOOD PRESSURE: 134 MMHG | BODY MASS INDEX: 35.08 KG/M2 | TEMPERATURE: 98.3 F | DIASTOLIC BLOOD PRESSURE: 74 MMHG | WEIGHT: 259 LBS | HEIGHT: 72 IN

## 2021-05-13 DIAGNOSIS — M54.81 CERVICO-OCCIPITAL NEURALGIA OF RIGHT SIDE: ICD-10-CM

## 2021-05-13 DIAGNOSIS — M54.12 CERVICAL RADICULOPATHY: Primary | ICD-10-CM

## 2021-05-13 DIAGNOSIS — M48.02 SPINAL STENOSIS IN CERVICAL REGION: ICD-10-CM

## 2021-05-13 PROCEDURE — 99213 OFFICE O/P EST LOW 20 MIN: CPT | Performed by: NURSE PRACTITIONER

## 2021-05-13 NOTE — TELEPHONE ENCOUNTER
I called and spoke with Dr. Radha Fisher office and requested last 3 office notes. They are going to fax them over.

## 2021-05-13 NOTE — TELEPHONE ENCOUNTER
Patient is aware, we will call once the schedule is open    LAST OFFICE VISIT 5- WITH GLADYS LAWSON     6 WK FU AFTER INJECTIONS    SCHEDULE AROUND 6-24-21

## 2021-05-18 ENCOUNTER — TELEPHONE (OUTPATIENT)
Dept: NEUROSURGERY | Facility: CLINIC | Age: 56
End: 2021-05-18

## 2021-05-18 NOTE — TELEPHONE ENCOUNTER
I called and spoke with Varsha at [facility] and let her know that I spoke with Josie with [facility] and injections are covered.

## 2021-05-18 NOTE — TELEPHONE ENCOUNTER
I called and spoke with Josie at [facility] to confirm the injection is covered. Josie said yes it is covered and read # 3 to me.

## 2021-05-19 ENCOUNTER — ANESTHESIA EVENT (OUTPATIENT)
Dept: PAIN MEDICINE | Facility: HOSPITAL | Age: 56
End: 2021-05-19

## 2021-05-19 ENCOUNTER — ANESTHESIA (OUTPATIENT)
Dept: PAIN MEDICINE | Facility: HOSPITAL | Age: 56
End: 2021-05-19

## 2021-05-19 ENCOUNTER — HOSPITAL ENCOUNTER (OUTPATIENT)
Dept: PAIN MEDICINE | Facility: HOSPITAL | Age: 56
Discharge: HOME | End: 2021-05-19
Admitting: NURSE PRACTITIONER

## 2021-05-19 VITALS
TEMPERATURE: 97.8 F | RESPIRATION RATE: 16 BRPM | DIASTOLIC BLOOD PRESSURE: 93 MMHG | OXYGEN SATURATION: 99 % | HEART RATE: 90 BPM | WEIGHT: 240 LBS | BODY MASS INDEX: 32.51 KG/M2 | HEIGHT: 72 IN | SYSTOLIC BLOOD PRESSURE: 142 MMHG

## 2021-05-19 DIAGNOSIS — M48.02 SPINAL STENOSIS IN CERVICAL REGION: ICD-10-CM

## 2021-05-19 DIAGNOSIS — M54.81 CERVICO-OCCIPITAL NEURALGIA OF RIGHT SIDE: ICD-10-CM

## 2021-05-19 DIAGNOSIS — M54.12 CERVICAL RADICULOPATHY: ICD-10-CM

## 2021-05-19 LAB — GLUCOSE BLDC GLUCOMTR-MCNC: 203 MG/DL (ref 70–130)

## 2021-05-19 PROCEDURE — 82962 GLUCOSE BLOOD TEST: CPT

## 2021-05-19 PROCEDURE — G0463 HOSPITAL OUTPT CLINIC VISIT: HCPCS

## 2021-05-19 RX ORDER — LIDOCAINE HYDROCHLORIDE 10 MG/ML
0.5 INJECTION, SOLUTION INFILTRATION; PERINEURAL ONCE AS NEEDED
Status: DISCONTINUED | OUTPATIENT
Start: 2021-05-19 | End: 2021-05-20 | Stop reason: HOSPADM

## 2021-05-19 RX ORDER — CHLORAL HYDRATE 500 MG
CAPSULE ORAL
COMMUNITY

## 2021-05-19 RX ORDER — SODIUM CHLORIDE 0.9 % (FLUSH) 0.9 %
3-10 SYRINGE (ML) INJECTION AS NEEDED
Status: DISCONTINUED | OUTPATIENT
Start: 2021-05-19 | End: 2021-05-20 | Stop reason: HOSPADM

## 2021-05-19 RX ORDER — SODIUM CHLORIDE 0.9 % (FLUSH) 0.9 %
3 SYRINGE (ML) INJECTION EVERY 12 HOURS SCHEDULED
Status: DISCONTINUED | OUTPATIENT
Start: 2021-05-19 | End: 2021-05-20 | Stop reason: HOSPADM

## 2021-05-19 NOTE — H&P
CHIEF COMPLAINT:  Neck pain        HISTORY OF PRESENT ILLNESS:  This patient is complaining of neck pain which radiates down both of his arms including the medial side of his arm and all 5 of his fingers with numbness, tingling, pain, and weakness. He also claims to have pain rating down the entirety of his back and into his legs. Of note, he was recently diagnosed with Lyme's disease and is receiving treatment for this.  It ranges between a 6 and 8 out of 10 on the pain scale.  The pain is described as aching, constant, cramping, deep, dull, numb, ripping, tearing, tingling, sharp, spasming, squeezing, stabbing, and throbbing in nature. The pain is exacerbated by nothing in particular, and relieved by pain medication, and rest.  The patient has tried conservative therapy for greater than 6 weeks including: Rest and over-the-counter medication.  The pain is significantly decreasing the patient's Activities of Daily Living.      Diagnostic imaging, specifically an MRI of the cervical spine without contrast from 4/20/2021 was read as: IMPRESSION:  Multilevel degenerative disc disease, most pronounced at  C5-6, where there is moderate spinal canal stenosis and moderate  bilateral neural foraminal stenosis, left greater than right.    This patient was referred to our clinic by GLADYS Almonte for cervical epidural steroid injections.      PAST MEDICAL HISTORY:  Current Outpatient Medications on File Prior to Encounter   Medication Sig Dispense Refill   • albuterol (ACCUNEB) 1.25 MG/3ML nebulizer solution Take 1 ampule by nebulization Every 6 (Six) Hours As Needed for Wheezing.     • doxycycline (DORYX) 100 MG enteric coated tablet Take 1 tablet by mouth 2 (Two) Times a Day. 28 tablet 0   • Dulaglutide (Trulicity) 1.5 MG/0.5ML solution pen-injector Inject 0.75 mg under the skin into the appropriate area as directed 1 (One) Time Per Week. Due every wednesdays 5 pen 5   • Insulin Glargine (LANTUS SOLOSTAR) 100 UNIT/ML  "injection pen Inject 15 Units under the skin into the appropriate area as directed Daily for 30 days. 15 mL 0   • Riboflavin 400 MG tablet Take 400 mg by mouth Daily. 90 tablet 2   • ASPIRIN 81 PO Take 81 mg/day by mouth.     • budesonide-formoterol (SYMBICORT) 160-4.5 MCG/ACT inhaler Inhale 2 puffs 2 (Two) Times a Day.     • Insulin Pen Needle (B-D UF III MINI PEN NEEDLES) 31G X 5 MM misc use with insulin injections once daily. 100 each 0   • Omega-3 Fatty Acids (fish oil) 1000 MG capsule capsule Take  by mouth Daily With Breakfast.     • vitamin E 100 UNIT capsule Take 50 Units by mouth Daily.       No current facility-administered medications on file prior to encounter.       Past Medical History:   Diagnosis Date   • Asthma    • Colon polyp    • Diabetes mellitus (CMS/HCC)    • GERD (gastroesophageal reflux disease)    • Lyme disease    • Migraine    • Peripheral neuropathy 2004   • Skin cancer     Basal Cell    • Sleep apnea    • Visual impairment        SOCIAL HISTORY:  Counseled to avoid tobacco     REVIEW OF SYSTEMS:  No pertinent hematologic, infectious, or constitutional symptoms.  Other review of systems non-contributory     PHYSICAL EXAM:  /93 (BP Location: Left arm, Patient Position: Sitting)   Pulse 90   Temp 36.6 °C (97.8 °F) (Infrared)   Resp 16   Ht 182.9 cm (72\")   Wt 109 kg (240 lb)   SpO2 99%   BMI 32.55 kg/m²   Constitutional: Well-developed well-nourished no acute distress  General: Alert and oriented  Neuromuscular: He has a decent range of motion of his cervical spine and is not having particular increases in pain based on movements of his neck. He states that his hand typically go numb at night after he is sleeping on his stomach. He is unable to describe certain movements which make his pain worse at this time. He does note that his right hand  strength seems to be getting little bit worse.       DIAGNOSIS:  Post-Op Diagnosis Codes:  Post-Op Diagnosis Codes:     * Cervical " radiculopathy [M54.12]     * Cervical spinal stenosis [M48.02]     PLAN:  -Unfortunately, this gentleman has been taking combination of aspirin, fish oil, and vitamin E. He is uncertain when he stopped taking those but thinks he possibly just stopped a couple of days ago. The risk of hematoma, in my opinion, is to great at this time to justify a cervical epidural today.  -  I think he is a future candidate for cervical epidural steroid injections once he has been appropriately off of the blood thinning substances with a planned entry level of C6-C7 and attempted medication spread to C5-C6 based on his MRI. These will likely be spaced approximately 2-4 weeks apart.  If pain control is acceptable after the first injection, it was discussed with the patient that they may return for the subsequent injections if and when their pain returns.    -This gentleman also tells me that he has had up to the [facility] for second opinion regarding his Lyme disease and his various pains. It is possible that he will receive an injection when he is up there.   - Risks were discussed with the patient, including but not limited to: failure of relief, worsening pain, tissue, nerve, blood vessel or spinal cord damage, post-dural-puncture headache, bleeding, epidural hematoma, infection, and epidural abscess. Benefits and alternatives were also discussed. No promises were made. Risks/benefits/alternatives of procedural medications were also discussed, including but not limited to hyperglycemia, fluid retention, decreased immune system function, osteoporosis, and anaphylactoid/allergic reactions. The patient voiced understanding and agreed to proceed in the future if appropriate.  -  It is recommended that the patient use the least amount of opioid medication possible.     -  Heat and ice to the affected area as tolerated for pain control.  It was discussed that heating pads can cause burns.  -  Daily low impact exercise such as  walking or water exercise was recommended to maintain overall health and aid in weight control.   -  Patient was counseled to abstain from tobacco products.  -  Follow up as needed for subsequent injections.  -We will be happy to see him again should he request an appointment once he returns from the [facility].  - My total time based on preparing to see the patient, obtaining and/or reviewing separately obtained history, performing a medically appropriate exam and/or evaluation, counseling and educating the patient/family/caregiver, documenting clinical information in the electronic or other health record, independently interpreting results and communicating results to the patient/family/caregiver, and in care coordination was 34 minutes consistent with a 91879 billing code.       Phong Tamayo M.D.  [facility], [facility]  Board Certified in Pain Medicine by the American Board of Anesthesiology  Board Certified in Anesthesiology by the American Board of Anesthesiology     Much of this encounter note is an electronic transcription/translation of spoken language to printed text. The electronic translation of spoken language may permit erroneous, or at times, nonsensical words or phrases to be inadvertently transcribed. Although I have reviewed the note for such errors, some may still exist.

## 2021-05-19 NOTE — ANESTHESIA PROCEDURE NOTES
No procedure was performed today.      Patient reassessed immediately prior to procedure    Performed by  Anesthesiologist: Phong Tamayo MD          No procedure was performed today.

## 2021-06-07 ENCOUNTER — TELEPHONE (OUTPATIENT)
Dept: NEUROSURGERY | Facility: CLINIC | Age: 56
End: 2021-06-07

## 2021-06-07 NOTE — TELEPHONE ENCOUNTER
Mrs. Tavera notified that the [facility] infectious disease  Did put an order in for a consult. That order has been faxed to [facility] per patients request.

## 2021-06-07 NOTE — TELEPHONE ENCOUNTER
Caller: Lacy Tavera    Relationship: Emergency Contact    Best call back number: [phone]    What is the medical concern/diagnosis: [facility]    What specialty or service is being requested: INFECTIOUS DISEASE DEPARTMENT    What is the office phone number: [phone]  [phone]    Any additional details: PT SISTER CALLED TO GET A REFERRAL TO THE [facility], INFECTIOUS DISEASE DEPARTMENT    PLEASE CALL PT SISTER IF ANY QUESTIONS     THANK YOU

## 2021-06-09 NOTE — TELEPHONE ENCOUNTER
PT'S SISTER CALLED BACK AND STATES THAT THE [facility] HAS NOT RECEIVED THE REFERRAL.     CONFIRMED FAX # [phone]    PLEASE CONTACT SISTER: FACUNDO     PH: [phone]    THANK YOU!

## 2021-06-10 NOTE — TELEPHONE ENCOUNTER
PT'S SISTER CALLED BACK AND STATES THAT THE [facility] HAS NOT RECEIVED THE REFERRAL.      CONFIRMED FAX # [phone]     PLEASE CONTACT SISTER: FACUNDO      PH: [phone]     PT SISTER WANTS TO KNOW IF WE CAN CONTACT [facility] TO FIND OUT WHAT IS GOING ON. SHE KNOWS THAT WE HAVE SENT THE REFERRAL A COUPLE OF TIMES HOWEVER THEY KEEP STATING THAT THEY HAVEN'T RECEIVED IT. [facility] PHONE # [phone]  PLEASE ADVISE  THANK YOU    PT SISTER WOULD LIKE A CALL BACK AFTER WE HAVE CONTACTED [facility].

## 2021-06-11 ENCOUNTER — TELEPHONE (OUTPATIENT)
Dept: NEUROLOGY | Facility: CLINIC | Age: 56
End: 2021-06-11

## 2021-06-11 DIAGNOSIS — G43.001 MIGRAINE WITHOUT AURA AND WITH STATUS MIGRAINOSUS, NOT INTRACTABLE: ICD-10-CM

## 2021-06-11 DIAGNOSIS — H51.23 INTERNUCLEAR OPHTHALMOPLEGIA, BILATERAL: Primary | ICD-10-CM

## 2021-06-11 DIAGNOSIS — G62.9 PERIPHERAL POLYNEUROPATHY: ICD-10-CM

## 2021-06-11 DIAGNOSIS — R83.5: ICD-10-CM

## 2021-06-11 NOTE — TELEPHONE ENCOUNTER
Provider: THORNTON  Caller: FACUNDO MIXON  Relationship to Patient: SISTER  Pharmacy: N/A  Phone Number: [phone]  Reason for Call: PT WAS SEEN BY DR. FRANKLIN IN HOSPITAL AS WELL AS IN THE OFFICE; PT SISTER WOULD LIKE A REFERRAL WRITTEN TO [facility] FOR HER BROTHER AS WELL AS FORWARDING HIS MEDICAL RECORDS.    PLEASE CALL & ADVISE.    THANK YOU

## 2021-06-11 NOTE — TELEPHONE ENCOUNTER
[facility] called the office back about getting further records. I explained the note from Carol. She will call Dr. Curry's office along with Medical Records.

## 2021-06-14 NOTE — TELEPHONE ENCOUNTER
PTS SISTER WOULD LIKE TO GET INFORMATION ON WHERE THE REFERRAL WAS SENT ON 6/11 @ 3;40 PM. PLEASE ADVISE.  PTS SISTER [phone]

## 2021-06-21 ENCOUNTER — TELEPHONE (OUTPATIENT)
Dept: NEUROSURGERY | Facility: CLINIC | Age: 56
End: 2021-06-21

## 2021-07-07 ENCOUNTER — TELEPHONE (OUTPATIENT)
Dept: NEUROLOGY | Facility: CLINIC | Age: 56
End: 2021-07-07

## 2021-07-07 NOTE — TELEPHONE ENCOUNTER
Attempted to reach patient to notify him that appt with Dr. Phelps has been rescheduled.  Left voicemail with new appt details, mailed reminder.

## 2021-07-27 ENCOUNTER — TELEPHONE (OUTPATIENT)
Dept: NEUROLOGY | Facility: CLINIC | Age: 56
End: 2021-07-27

## 2021-07-27 NOTE — TELEPHONE ENCOUNTER
Attempted to reach patient to notify him that his upcoming appt w/ Dr. Phelps has been rescheduled.  Left new appt details and also mailed reminder letter.